# Patient Record
Sex: MALE | Race: BLACK OR AFRICAN AMERICAN | NOT HISPANIC OR LATINO | URBAN - METROPOLITAN AREA
[De-identification: names, ages, dates, MRNs, and addresses within clinical notes are randomized per-mention and may not be internally consistent; named-entity substitution may affect disease eponyms.]

---

## 2023-08-04 ENCOUNTER — INPATIENT (INPATIENT)
Facility: HOSPITAL | Age: 53
LOS: 2 days | Discharge: HOME | DRG: 378 | End: 2023-08-07
Attending: GENERAL ACUTE CARE HOSPITAL | Admitting: GENERAL ACUTE CARE HOSPITAL
Payer: MEDICAID

## 2023-08-04 VITALS
SYSTOLIC BLOOD PRESSURE: 133 MMHG | OXYGEN SATURATION: 98 % | HEART RATE: 143 BPM | TEMPERATURE: 100 F | DIASTOLIC BLOOD PRESSURE: 77 MMHG | RESPIRATION RATE: 18 BRPM | WEIGHT: 207.45 LBS

## 2023-08-04 LAB
ALBUMIN SERPL ELPH-MCNC: 3.5 G/DL — SIGNIFICANT CHANGE UP (ref 3.3–5)
ALP SERPL-CCNC: 37 U/L — LOW (ref 40–120)
ALT FLD-CCNC: 12 U/L — SIGNIFICANT CHANGE UP (ref 10–45)
ANION GAP SERPL CALC-SCNC: 6 MMOL/L — SIGNIFICANT CHANGE UP (ref 5–17)
APTT BLD: 28.3 SEC — SIGNIFICANT CHANGE UP (ref 24.5–35.6)
AST SERPL-CCNC: 15 U/L — SIGNIFICANT CHANGE UP (ref 10–40)
BASOPHILS # BLD AUTO: 0.02 K/UL — SIGNIFICANT CHANGE UP (ref 0–0.2)
BASOPHILS NFR BLD AUTO: 0.3 % — SIGNIFICANT CHANGE UP (ref 0–2)
BILIRUB SERPL-MCNC: <0.2 MG/DL — SIGNIFICANT CHANGE UP (ref 0.2–1.2)
BLD GP AB SCN SERPL QL: NEGATIVE — SIGNIFICANT CHANGE UP
BUN SERPL-MCNC: 27 MG/DL — HIGH (ref 7–23)
CALCIUM SERPL-MCNC: 8.6 MG/DL — SIGNIFICANT CHANGE UP (ref 8.4–10.5)
CHLORIDE SERPL-SCNC: 106 MMOL/L — SIGNIFICANT CHANGE UP (ref 96–108)
CO2 SERPL-SCNC: 26 MMOL/L — SIGNIFICANT CHANGE UP (ref 22–31)
CREAT SERPL-MCNC: 0.92 MG/DL — SIGNIFICANT CHANGE UP (ref 0.5–1.3)
EGFR: 99 ML/MIN/1.73M2 — SIGNIFICANT CHANGE UP
EOSINOPHIL # BLD AUTO: 0.05 K/UL — SIGNIFICANT CHANGE UP (ref 0–0.5)
EOSINOPHIL NFR BLD AUTO: 0.6 % — SIGNIFICANT CHANGE UP (ref 0–6)
GLUCOSE SERPL-MCNC: 144 MG/DL — HIGH (ref 70–99)
HCT VFR BLD CALC: 24.5 % — LOW (ref 39–50)
HCT VFR BLD CALC: 24.8 % — LOW (ref 39–50)
HGB BLD-MCNC: 8.3 G/DL — LOW (ref 13–17)
HGB BLD-MCNC: 8.5 G/DL — LOW (ref 13–17)
IMM GRANULOCYTES NFR BLD AUTO: 0.5 % — SIGNIFICANT CHANGE UP (ref 0–0.9)
INR BLD: 1.09 — SIGNIFICANT CHANGE UP (ref 0.85–1.18)
LACTATE SERPL-SCNC: 1 MMOL/L — SIGNIFICANT CHANGE UP (ref 0.5–2)
LYMPHOCYTES # BLD AUTO: 2.35 K/UL — SIGNIFICANT CHANGE UP (ref 1–3.3)
LYMPHOCYTES # BLD AUTO: 30.3 % — SIGNIFICANT CHANGE UP (ref 13–44)
MCHC RBC-ENTMCNC: 29.5 PG — SIGNIFICANT CHANGE UP (ref 27–34)
MCHC RBC-ENTMCNC: 29.7 PG — SIGNIFICANT CHANGE UP (ref 27–34)
MCHC RBC-ENTMCNC: 33.9 GM/DL — SIGNIFICANT CHANGE UP (ref 32–36)
MCHC RBC-ENTMCNC: 34.3 GM/DL — SIGNIFICANT CHANGE UP (ref 32–36)
MCV RBC AUTO: 86.1 FL — SIGNIFICANT CHANGE UP (ref 80–100)
MCV RBC AUTO: 87.8 FL — SIGNIFICANT CHANGE UP (ref 80–100)
MONOCYTES # BLD AUTO: 0.57 K/UL — SIGNIFICANT CHANGE UP (ref 0–0.9)
MONOCYTES NFR BLD AUTO: 7.3 % — SIGNIFICANT CHANGE UP (ref 2–14)
NEUTROPHILS # BLD AUTO: 4.73 K/UL — SIGNIFICANT CHANGE UP (ref 1.8–7.4)
NEUTROPHILS NFR BLD AUTO: 61 % — SIGNIFICANT CHANGE UP (ref 43–77)
NRBC # BLD: 0 /100 WBCS — SIGNIFICANT CHANGE UP (ref 0–0)
NRBC # BLD: 0 /100 WBCS — SIGNIFICANT CHANGE UP (ref 0–0)
OB PNL STL: POSITIVE
PLATELET # BLD AUTO: 144 K/UL — LOW (ref 150–400)
PLATELET # BLD AUTO: 161 K/UL — SIGNIFICANT CHANGE UP (ref 150–400)
POTASSIUM SERPL-MCNC: 3.6 MMOL/L — SIGNIFICANT CHANGE UP (ref 3.5–5.3)
POTASSIUM SERPL-SCNC: 3.6 MMOL/L — SIGNIFICANT CHANGE UP (ref 3.5–5.3)
PROT SERPL-MCNC: 5.8 G/DL — LOW (ref 6–8.3)
PROTHROM AB SERPL-ACNC: 12.4 SEC — SIGNIFICANT CHANGE UP (ref 9.5–13)
RAPID RVP RESULT: SIGNIFICANT CHANGE UP
RBC # BLD: 2.79 M/UL — LOW (ref 4.2–5.8)
RBC # BLD: 2.88 M/UL — LOW (ref 4.2–5.8)
RBC # FLD: 12.6 % — SIGNIFICANT CHANGE UP (ref 10.3–14.5)
RBC # FLD: 13 % — SIGNIFICANT CHANGE UP (ref 10.3–14.5)
RH IG SCN BLD-IMP: POSITIVE — SIGNIFICANT CHANGE UP
RH IG SCN BLD-IMP: POSITIVE — SIGNIFICANT CHANGE UP
SARS-COV-2 RNA SPEC QL NAA+PROBE: SIGNIFICANT CHANGE UP
SODIUM SERPL-SCNC: 138 MMOL/L — SIGNIFICANT CHANGE UP (ref 135–145)
WBC # BLD: 10.02 K/UL — SIGNIFICANT CHANGE UP (ref 3.8–10.5)
WBC # BLD: 7.76 K/UL — SIGNIFICANT CHANGE UP (ref 3.8–10.5)
WBC # FLD AUTO: 10.02 K/UL — SIGNIFICANT CHANGE UP (ref 3.8–10.5)
WBC # FLD AUTO: 7.76 K/UL — SIGNIFICANT CHANGE UP (ref 3.8–10.5)

## 2023-08-04 PROCEDURE — 74174 CTA ABD&PLVS W/CONTRAST: CPT | Mod: 26

## 2023-08-04 PROCEDURE — 99291 CRITICAL CARE FIRST HOUR: CPT

## 2023-08-04 PROCEDURE — 74018 RADEX ABDOMEN 1 VIEW: CPT | Mod: 26

## 2023-08-04 RX ORDER — SODIUM CHLORIDE 9 MG/ML
1000 INJECTION INTRAMUSCULAR; INTRAVENOUS; SUBCUTANEOUS ONCE
Refills: 0 | Status: COMPLETED | OUTPATIENT
Start: 2023-08-04 | End: 2023-08-04

## 2023-08-04 RX ORDER — PANTOPRAZOLE SODIUM 20 MG/1
80 TABLET, DELAYED RELEASE ORAL ONCE
Refills: 0 | Status: COMPLETED | OUTPATIENT
Start: 2023-08-04 | End: 2023-08-04

## 2023-08-04 RX ORDER — PANTOPRAZOLE SODIUM 20 MG/1
8 TABLET, DELAYED RELEASE ORAL
Qty: 80 | Refills: 0 | Status: DISCONTINUED | OUTPATIENT
Start: 2023-08-04 | End: 2023-08-06

## 2023-08-04 RX ORDER — ACETAMINOPHEN 500 MG
1000 TABLET ORAL ONCE
Refills: 0 | Status: COMPLETED | OUTPATIENT
Start: 2023-08-04 | End: 2023-08-04

## 2023-08-04 RX ADMIN — SODIUM CHLORIDE 1000 MILLILITER(S): 9 INJECTION INTRAMUSCULAR; INTRAVENOUS; SUBCUTANEOUS at 16:25

## 2023-08-04 RX ADMIN — SODIUM CHLORIDE 1000 MILLILITER(S): 9 INJECTION INTRAMUSCULAR; INTRAVENOUS; SUBCUTANEOUS at 20:00

## 2023-08-04 RX ADMIN — Medication 1000 MILLIGRAM(S): at 18:05

## 2023-08-04 RX ADMIN — PANTOPRAZOLE SODIUM 80 MILLIGRAM(S): 20 TABLET, DELAYED RELEASE ORAL at 16:24

## 2023-08-04 RX ADMIN — Medication 400 MILLIGRAM(S): at 16:57

## 2023-08-04 NOTE — CONSULT NOTE ADULT - SUBJECTIVE AND OBJECTIVE BOX
===================== ICU Consult ============================    Consult reason: melena, acute blood loss anemia    Mr Zavala is 53M with no PMH but poor medical followup. He reports he had melena x 3 yesterday no BRBPR. Denies fever, chills, n/v/d/constipation, abdominal pain, chest pain, SOB, hemoptysis.  Pt was seen at bedside resting comfortably satting well on RA receiving 1U pRBC. Denied pain, Last BM this AM reportedly brown. Denies NSAID use and fhx of colon cancer. No hx of endoscopies/colonscopies.    PAST MEDICAL & SURGICAL HISTORY:  No pertinent past medical history      No significant past surgical history          PAST MEDICAL & SURGICAL HISTORY:  No pertinent past medical history    No significant past surgical history    Allergies    Allergy Status Unknown    Intolerances      Home Medications: none      SOCIAL HX:     Smoking        no smoking  ETOH/Illicit drugs        drinks 1-2 Heinekens per night    PAST MEDICAL & SURGICAL HISTORY:  No pertinent past medical history      No significant past surgical history          FAMILY HISTORY:  :      ROS:  See HPI     ICU Vital Signs Last 24 Hrs  T(C): 36.9 (04 Aug 2023 18:40), Max: 37.6 (04 Aug 2023 15:53)  T(F): 98.5 (04 Aug 2023 18:40), Max: 99.7 (04 Aug 2023 15:53)  HR: 130 (04 Aug 2023 20:25) (110 - 143)  BP: 130/75 (04 Aug 2023 20:25) (104/60 - 133/78)  BP(mean): --  ABP: --  ABP(mean): --  RR: 16 (04 Aug 2023 20:25) (16 - 20)  SpO2: 100% (04 Aug 2023 20:25) (98% - 100%)    O2 Parameters below as of 04 Aug 2023 20:25  Patient On (Oxygen Delivery Method): room air            PHYSICAL EXAM  General: NAD  Head: NC/AT; MMM; PERRL; EOMI;  Neck: Supple; no JVD  Respiratory: CTAB; no wheezes/rales/rhonchi  Cardiovascular: Regular rhythm/rate; S1/S2+, no murmurs, rubs gallops   Gastrointestinal: Soft; NTND; bowel sounds normal and present, bounding pulse in abdomen  Extremities: WWP; no edema/cyanosis  Neurological: A&Ox3, CNII-XII grossly intact; no obvious focal deficits  Skin: Clean and intact. Good skin turgor. Without open wounds and sores          LABS:                          8.3    7.76  )-----------( 161      ( 04 Aug 2023 16:15 )             24.5                                               08-04    138  |  106  |  27<H>  ----------------------------<  144<H>  3.6   |  26  |  0.92    Ca    8.6      04 Aug 2023 16:15    TPro  5.8<L>  /  Alb  3.5  /  TBili  <0.2  /  DBili  x   /  AST  15  /  ALT  12  /  AlkPhos  37<L>  08-04      PT/INR - ( 04 Aug 2023 16:15 )   PT: 12.4 sec;   INR: 1.09          PTT - ( 04 Aug 2023 16:15 )  PTT:28.3 sec                                       Urinalysis Basic - ( 04 Aug 2023 16:15 )    Color: x / Appearance: x / SG: x / pH: x  Gluc: 144 mg/dL / Ketone: x  / Bili: x / Urobili: x   Blood: x / Protein: x / Nitrite: x   Leuk Esterase: x / RBC: x / WBC x   Sq Epi: x / Non Sq Epi: x / Bacteria: x                                                  LIVER FUNCTIONS - ( 04 Aug 2023 16:15 )  Alb: 3.5 g/dL / Pro: 5.8 g/dL / ALK PHOS: 37 U/L / ALT: 12 U/L / AST: 15 U/L / GGT: x                                                                                                                                       MEDICATIONS  (STANDING):    MEDICATIONS  (PRN):         ===================== ICU Consult ============================    Consult reason: melena, acute blood loss anemia    53M w/ no known PMHx presents to [facility] ED for episodes of symptomatic melena. Patient had 3 episodes of melena yesterday and one episode earlier today with associated dizziness prompting the ED visit. Patient recently emigrated from [place] 2-3 weeks ago and has had poor medical follow-up previously. Patient endorsed recently having more dyspepsia, however denied GERD, chest or abdominal discomfort. Takes PRN Tylenol as needed. Denies AC, NSAIDs, iron supplements or smoking. Endorsed drinking 1-2 beers (Heineken) every 2-3 days for the last 30 years. No previous known history of cirrhosis and no personal/family history of CRC. Never had EGD/C-scope.      PAST MEDICAL & SURGICAL HISTORY:  No pertinent past medical history      No significant past surgical history      PAST MEDICAL & SURGICAL HISTORY:  No pertinent past medical history    No significant past surgical history    Allergies    Allergy Status Unknown    Intolerances      Home Medications: none      SOCIAL HX:     Smoking        no smoking  ETOH/Illicit drugs        drinks 1-2 Heinek[employer] per night    PAST MEDICAL & SURGICAL HISTORY:  No pertinent past medical history      No significant past surgical history      FAMILY HISTORY:  :      ROS:  See HPI     ICU Vital Signs Last 24 Hrs  T(C): 36.9 (04 Aug 2023 18:40), Max: 37.6 (04 Aug 2023 15:53)  T(F): 98.5 (04 Aug 2023 18:40), Max: 99.7 (04 Aug 2023 15:53)  HR: 130 (04 Aug 2023 20:25) (110 - 143)  BP: 130/75 (04 Aug 2023 20:25) (104/60 - 133/78)  BP(mean): --  ABP: --  ABP(mean): --  RR: 16 (04 Aug 2023 20:25) (16 - 20)  SpO2: 100% (04 Aug 2023 20:25) (98% - 100%)    O2 Parameters below as of 04 Aug 2023 20:25  Patient On (Oxygen Delivery Method): room air      PHYSICAL EXAM  General: NAD, laying in bed, speaking in full sentences  HEENT: head NC/AT, no conjunctival injection, EOMI, dry MM, no dried/alberto blood seen   Neck: supple, no JVD  Cardio: +tachycardic, +S1/S2, no M/R/G  Resp: lungs CTAB, no cough/wheezes/rales/rhonchi, on room air  Abdo: soft, NT, ND, +bowel sounds x4, no organomegaly or palpable mass, +melena    Extremities: WWP, no edema/cyanosis/clubbing   Vasc: 2+ radial and DP pulses b/l  Neuro: A&Ox3, no focal deficits  Psych: speech non-pressured, thoughts goal-oriented  Skin: dry, intact, no visible jaundice   MSK: no joint swelling        LABS:                          8.3    7.76  )-----------( 161      ( 04 Aug 2023 16:15 )             24.5                                               08-04    138  |  106  |  27<H>  ----------------------------<  144<H>  3.6   |  26  |  0.92    Ca    8.6      04 Aug 2023 16:15    TPro  5.8<L>  /  Alb  3.5  /  TBili  <0.2  /  DBili  x   /  AST  15  /  ALT  12  /  AlkPhos  37<L>  08-04      PT/INR - ( 04 Aug 2023 16:15 )   PT: 12.4 sec;   INR: 1.09          PTT - ( 04 Aug 2023 16:15 )  PTT:28.3 sec                                       Urinalysis Basic - ( 04 Aug 2023 16:15 )    Color: x / Appearance: x / SG: x / pH: x  Gluc: 144 mg/dL / Ketone: x  / Bili: x / Urobili: x   Blood: x / Protein: x / Nitrite: x   Leuk Esterase: x / RBC: x / WBC x   Sq Epi: x / Non Sq Epi: x / Bacteria: x                                                  LIVER FUNCTIONS - ( 04 Aug 2023 16:15 )  Alb: 3.5 g/dL / Pro: 5.8 g/dL / ALK PHOS: 37 U/L / ALT: 12 U/L / AST: 15 U/L / GGT: x                                                                                                                                       MEDICATIONS  (STANDING):    MEDICATIONS  (PRN):

## 2023-08-04 NOTE — ED PROVIDER NOTE - CRITICAL CARE ATTENDING CONTRIBUTION TO CARE
Stan Tobin MD:    Patient seen immediately as clinical upgrade for significantly elevated heart rate.     Due to a high probability of clinically significant, life threatening deterioration, the patient required my highest level of preparedness to intervene emergently and I personally spent this critical care time directly and personally managing the patient. This critical care time included obtaining a history; examining the patient; pulse oximetry; ordering and review of studies; arranging urgent treatment with development of a management plan; evaluation of patient's response to treatment; frequent reassessment; and, discussions with other providers.  This critical care time was performed to assess and manage the high probability of imminent, life-threatening deterioration that could result in multi-organ failure. It was exclusive of separately billable procedures and treating other patients and teaching time.

## 2023-08-04 NOTE — CONSULT NOTE ADULT - ASSESSMENT
Mr Sally is 53M with no PMH but poor medical followup presenting for melena x3. ICU consult for melena     s/p protonix IVP 80, NS 2L  - check post transfusion CBC  - maintain active T&S  - maintain large bore IVs Mr Zavala is 53M with no PMH but poor medical followup presenting for melena x3. ICU consult for melena     #UGIB  Patient presenting with UGIB. Hgb 8.3 s/p 1 pRBC and 2L NS. HR 140s.  - Maintain 2 large IV bore  - c/w PPI gtt  - Can consider GI consult in AM  - Keep patient NPO  - Monitor for bleeding  - Trend Hgb  - Maintain active T&S  - Transfuse if Hgb <7       MISC  Fluids: none  Electrolytes: replete K>3.8, Mg>1.8, Phos>2.5 IV  Diet: NPO  DVT Prophylaxis: SCD, HOLD ppx given UGIB   GI Prophylaxis: Pantoprazole gtt  Code Status: Full code  Dispo:     Thank you for this consult. Discussed with Intensivist    Recommendations are final after attending attestation.  Mr Zavala is 53M with no PMH but poor medical followup presenting for melena x3. ICU consult for melena     #Sinus Tachycardia  #Symptomatic GIB  Patient presenting with 4 episodes of melena with dizziness today. On admission, patient was afebrile, HR 140s, BP stable. Hgb 8.3 s/p 1 pRBC and 2L NS. Obtain CTA given c/f pulsatile abdomen, however no evidence of active gastrointestinal hemorrhage. Likely 2/2 esophagitis vs erosive gastritis. No episodes of hemoptysis or known history of cirrhosis. Patient drinks 1-2 beers q2-3 days for 30 years.  - Repeat post-transfusion CBC  - Maintain 2 large IV bore  - c/w PPI gtt for now  - GI consult in AM  - Keep patient NPO  - Monitor for bleeding  - Trend Hgb BID  - Maintain active T&S  - Transfuse if Hgb <7 or if hemodynamics continue to worsen  - Would also bladder scan given distended bladder on imaging as factor of tachycardia      MISC  Fluids: S/p 2L NS  Electrolytes: replete K>3.8, Mg>1.8, Phos>2.5 IV  Diet: NPO  DVT Prophylaxis: SCD, HOLD ppx given UGIB   GI Prophylaxis: Pantoprazole gtt  Code Status: Full code  Dispo: TELE    Thank you for this consult. Discussed with Intensivist Dr. Joaquin  Recommendations are final after attending attestation.  53M w/ no known PMHx presents to [facility] ED for episodes of symptomatic melena. ICU consult for melena     #Sinus Tachycardia  #Symptomatic GIB  Patient presenting with 4 episodes of melena with dizziness today. On admission, patient was afebrile, HR 140s, BP stable. Hgb 8.3 s/p 1 pRBC and 2L NS. Obtain CTA given c/f pulsatile abdomen, however no evidence of active gastrointestinal hemorrhage. Likely 2/2 esophagitis vs erosive gastritis. No episodes of hemoptysis or known history of cirrhosis. Patient drinks 1-2 beers q2-3 days for 30 years.  - Repeat post-transfusion CBC  - Maintain 2 large IV bore  - c/w PPI gtt for now  - GI consult in AM  - Keep patient NPO  - Monitor for bleeding  - Trend Hgb BID  - Maintain active T&S  - Transfuse if Hgb <7 or if hemodynamics continue to worsen or active bleeding  - Would also bladder scan given distended bladder on imaging as factor of tachycardia      MISC  Fluids: S/p 2L NS  Electrolytes: replete K>3.8, Mg>1.8, Phos>2.5 IV  Diet: NPO  DVT Prophylaxis: SCD, HOLD ppx given UGIB   GI Prophylaxis: Pantoprazole gtt  Code Status: Full code  Dispo: TELE    Thank you for this consult. Discussed with Intensivist Dr. Joaquin  Recommendations are final after attending attestation.

## 2023-08-04 NOTE — ED ADULT NURSE REASSESSMENT NOTE - NS ED NURSE REASSESS COMMENT FT1
pt aaox3 no deficits.   no sob no chest pain no abd pain no n/v.  PRBC infusing via pump.  iv fluids infusing.   pending dispo and admission.
pt remains comf.   no sob, chest pain or n/v.  PRBC completed.   pending admission.
Patient to receive blood transfusion. Labs reviewed. Patient consent in chart. Patient educated on possible signs of blood transfusion and informed to notify staff if patient were to develop any severe respiratory distress, flank pain, or any other major concerns. Patient with two large bore IV access sites. Patient to receive VS per policy and procedure. Blood product checked with second RN. Will continue to monitor with frequent VS and for possible transfusion reactions.

## 2023-08-04 NOTE — ED PROVIDER NOTE - PROGRESS NOTE DETAILS
Stan Tobin MD: Hgb 8.3, reassesesed bedside, still mentating well, comfortably, has some +chills. Hemodynamically stable, HR improved to 110s. Consented for pRBC transfusion, discussed r/b/a, written consent obtained and placed into chart. Will treat for symptomatic anemia in setting of likely upper GI bleed, guiac positive, tba. Stan Tobin MD: Patient reassessed again, -110s, BP stable, much more comfortable after IV ofirmev dose. pRBC ordered pending receipt from blood bank. Signed out to CARO. Stan Tobin MD: Patient found to be HR in 120-130s by medicine resident, concern from CARO regarding safety for floor dispo, will call ICU for eval. Stan Tobin MD: Patient seen by ICU resident, requests CTA abdomen/pelvis and additional 1L IVF bolus, ordered.

## 2023-08-04 NOTE — ED PROVIDER NOTE - PHYSICAL EXAMINATION
Gen - NAD, tired appearing but mentating fully; A+Ox3   HEENT - NCAT, EOMI, moist mucous membranes, clear oropharynx, pale conjunctiva  Neck - supple  Resp - CTAB, no increased WOB  CV -  RRR, no m/r/g  Abd - soft, NT, ND; no guarding or rebound  Back - no CVA tenderness  MSK - FROM of b/l UE and LE, no gross deformities  Extrem - no LE edema  Neuro - no focal motor or sensation deficits  Skin - warm, well perfused  Rectal (chaperone MS4 Renetta) - no ext/int hemorrhoids, +melenic stool, no alberto blood/active hemorrhage No

## 2023-08-04 NOTE — ED PROVIDER NOTE - CLINICAL SUMMARY MEDICAL DECISION MAKING FREE TEXT BOX
53 year old male, no reported pmh/psx, hasn't seen a doctor in years, presenting with generalized weakness in setting of melena x 2d. Tachycardic to 140s here on arrival but BP wnl/stable, mentating well on exam, good distal perfusion. Rectal exam showing melenic stool but no active hemorrhage/alberto blood. Abdomen soft/NT. Suspect symptomatic anemia 2/2 upper GI bleed. IV PPI push dose given. Will likely need pRBC transfusion pending cbc result. Anticipate admission.

## 2023-08-04 NOTE — ED ADULT TRIAGE NOTE - CHIEF COMPLAINT QUOTE
"I feel weak and I have black stools since yesterday."  pt appears having difficulty walking and speaking full sentences.

## 2023-08-04 NOTE — ED PROVIDER NOTE - OBJECTIVE STATEMENT
53 year old male, no reported pmh/psx, hasn't seen a doctor in years, presenting with generalized weakness in setting of melena x 2d. Per patient and spouse collateral at bedside--patient had ~3 episodes of black/tarry stools yesterday, 1 today, no abdominal/rectal pain, reports generalized weakness/lightheadedness and possible low grade fevers. Does not take blood thinners. No recent travel or sick contacts. Denies chest pain, cough, sob, n/v/d, dysuria.

## 2023-08-04 NOTE — ED ADULT NURSE NOTE - BEFAST SCREENING
Negative

## 2023-08-04 NOTE — ED ADULT NURSE NOTE - NSFALLUNIVINTERV_ED_ALL_ED
Bed/Stretcher in lowest position, wheels locked, appropriate side rails in place/Call bell, personal items and telephone in reach/Instruct patient to call for assistance before getting out of bed/chair/stretcher/Non-slip footwear applied when patient is off stretcher/[place] to call system/Physically safe environment - no spills, clutter or unnecessary equipment/Purposeful proactive rounding/Room/bathroom lighting operational, light cord in reach

## 2023-08-04 NOTE — ED ADULT NURSE NOTE - OBJECTIVE STATEMENT
Pt is 53 y.o male pt walked in c/o black tarry stools 3 times yesterday and one episode today. Denies cp, sob, fever, chills, n/v/d, abd pain. No known PMHx. Pt upgraded to Dr. Tobin at bs. EKG in prog. Pt hooked to monitor and spo2.nerted and labs sent. Pt A&Ox4. Pt is conversive in full sentences. Assessment ongoing. Will cont to monitor.

## 2023-08-05 DIAGNOSIS — I48.91 UNSPECIFIED ATRIAL FIBRILLATION: ICD-10-CM

## 2023-08-05 DIAGNOSIS — K92.2 GASTROINTESTINAL HEMORRHAGE, UNSPECIFIED: ICD-10-CM

## 2023-08-05 DIAGNOSIS — Z29.9 ENCOUNTER FOR PROPHYLACTIC MEASURES, UNSPECIFIED: ICD-10-CM

## 2023-08-05 DIAGNOSIS — D64.9 ANEMIA, UNSPECIFIED: ICD-10-CM

## 2023-08-05 DIAGNOSIS — R00.0 TACHYCARDIA, UNSPECIFIED: ICD-10-CM

## 2023-08-05 LAB
ALBUMIN SERPL ELPH-MCNC: 3.5 G/DL — SIGNIFICANT CHANGE UP (ref 3.3–5)
ALP SERPL-CCNC: 33 U/L — LOW (ref 40–120)
ALT FLD-CCNC: 10 U/L — SIGNIFICANT CHANGE UP (ref 10–45)
ANION GAP SERPL CALC-SCNC: 8 MMOL/L — SIGNIFICANT CHANGE UP (ref 5–17)
APPEARANCE UR: CLEAR — SIGNIFICANT CHANGE UP
AST SERPL-CCNC: 16 U/L — SIGNIFICANT CHANGE UP (ref 10–40)
BASOPHILS # BLD AUTO: 0.04 K/UL — SIGNIFICANT CHANGE UP (ref 0–0.2)
BASOPHILS # BLD AUTO: 0.04 K/UL — SIGNIFICANT CHANGE UP (ref 0–0.2)
BASOPHILS NFR BLD AUTO: 0.4 % — SIGNIFICANT CHANGE UP (ref 0–2)
BASOPHILS NFR BLD AUTO: 0.5 % — SIGNIFICANT CHANGE UP (ref 0–2)
BILIRUB SERPL-MCNC: 0.3 MG/DL — SIGNIFICANT CHANGE UP (ref 0.2–1.2)
BILIRUB UR-MCNC: NEGATIVE — SIGNIFICANT CHANGE UP
BLD GP AB SCN SERPL QL: NEGATIVE — SIGNIFICANT CHANGE UP
BUN SERPL-MCNC: 12 MG/DL — SIGNIFICANT CHANGE UP (ref 7–23)
CALCIUM SERPL-MCNC: 7.7 MG/DL — LOW (ref 8.4–10.5)
CHLORIDE SERPL-SCNC: 107 MMOL/L — SIGNIFICANT CHANGE UP (ref 96–108)
CO2 SERPL-SCNC: 25 MMOL/L — SIGNIFICANT CHANGE UP (ref 22–31)
COLOR SPEC: YELLOW — SIGNIFICANT CHANGE UP
CREAT SERPL-MCNC: 0.88 MG/DL — SIGNIFICANT CHANGE UP (ref 0.5–1.3)
DIFF PNL FLD: NEGATIVE — SIGNIFICANT CHANGE UP
EGFR: 102 ML/MIN/1.73M2 — SIGNIFICANT CHANGE UP
EOSINOPHIL # BLD AUTO: 0.06 K/UL — SIGNIFICANT CHANGE UP (ref 0–0.5)
EOSINOPHIL # BLD AUTO: 0.24 K/UL — SIGNIFICANT CHANGE UP (ref 0–0.5)
EOSINOPHIL NFR BLD AUTO: 0.6 % — SIGNIFICANT CHANGE UP (ref 0–6)
EOSINOPHIL NFR BLD AUTO: 3.1 % — SIGNIFICANT CHANGE UP (ref 0–6)
FERRITIN SERPL-MCNC: 138 NG/ML — SIGNIFICANT CHANGE UP (ref 30–400)
GLUCOSE SERPL-MCNC: 106 MG/DL — HIGH (ref 70–99)
GLUCOSE UR QL: NEGATIVE — SIGNIFICANT CHANGE UP
HAV IGM SER-ACNC: SIGNIFICANT CHANGE UP
HBV CORE IGM SER-ACNC: SIGNIFICANT CHANGE UP
HBV SURFACE AG SER-ACNC: SIGNIFICANT CHANGE UP
HCT VFR BLD CALC: 25.1 % — LOW (ref 39–50)
HCT VFR BLD CALC: 25.4 % — LOW (ref 39–50)
HCT VFR BLD CALC: 25.5 % — LOW (ref 39–50)
HCV AB S/CO SERPL IA: 0.04 S/CO — SIGNIFICANT CHANGE UP
HCV AB SERPL-IMP: SIGNIFICANT CHANGE UP
HGB BLD-MCNC: 8.3 G/DL — LOW (ref 13–17)
HGB BLD-MCNC: 8.6 G/DL — LOW (ref 13–17)
HGB BLD-MCNC: 8.6 G/DL — LOW (ref 13–17)
IMM GRANULOCYTES NFR BLD AUTO: 0.3 % — SIGNIFICANT CHANGE UP (ref 0–0.9)
IMM GRANULOCYTES NFR BLD AUTO: 0.3 % — SIGNIFICANT CHANGE UP (ref 0–0.9)
IRON SATN MFR SERPL: 22 % — SIGNIFICANT CHANGE UP (ref 16–55)
IRON SATN MFR SERPL: 50 UG/DL — SIGNIFICANT CHANGE UP (ref 45–165)
KETONES UR-MCNC: NEGATIVE — SIGNIFICANT CHANGE UP
LEUKOCYTE ESTERASE UR-ACNC: NEGATIVE — SIGNIFICANT CHANGE UP
LYMPHOCYTES # BLD AUTO: 2.5 K/UL — SIGNIFICANT CHANGE UP (ref 1–3.3)
LYMPHOCYTES # BLD AUTO: 2.7 K/UL — SIGNIFICANT CHANGE UP (ref 1–3.3)
LYMPHOCYTES # BLD AUTO: 25.3 % — SIGNIFICANT CHANGE UP (ref 13–44)
LYMPHOCYTES # BLD AUTO: 35.2 % — SIGNIFICANT CHANGE UP (ref 13–44)
MAGNESIUM SERPL-MCNC: 2 MG/DL — SIGNIFICANT CHANGE UP (ref 1.6–2.6)
MCHC RBC-ENTMCNC: 29.1 PG — SIGNIFICANT CHANGE UP (ref 27–34)
MCHC RBC-ENTMCNC: 29.3 PG — SIGNIFICANT CHANGE UP (ref 27–34)
MCHC RBC-ENTMCNC: 29.6 PG — SIGNIFICANT CHANGE UP (ref 27–34)
MCHC RBC-ENTMCNC: 33.1 GM/DL — SIGNIFICANT CHANGE UP (ref 32–36)
MCHC RBC-ENTMCNC: 33.7 GM/DL — SIGNIFICANT CHANGE UP (ref 32–36)
MCHC RBC-ENTMCNC: 33.9 GM/DL — SIGNIFICANT CHANGE UP (ref 32–36)
MCV RBC AUTO: 86.7 FL — SIGNIFICANT CHANGE UP (ref 80–100)
MCV RBC AUTO: 87.3 FL — SIGNIFICANT CHANGE UP (ref 80–100)
MCV RBC AUTO: 88.1 FL — SIGNIFICANT CHANGE UP (ref 80–100)
MONOCYTES # BLD AUTO: 0.5 K/UL — SIGNIFICANT CHANGE UP (ref 0–0.9)
MONOCYTES # BLD AUTO: 0.63 K/UL — SIGNIFICANT CHANGE UP (ref 0–0.9)
MONOCYTES NFR BLD AUTO: 6.4 % — SIGNIFICANT CHANGE UP (ref 2–14)
MONOCYTES NFR BLD AUTO: 6.5 % — SIGNIFICANT CHANGE UP (ref 2–14)
NEUTROPHILS # BLD AUTO: 4.17 K/UL — SIGNIFICANT CHANGE UP (ref 1.8–7.4)
NEUTROPHILS # BLD AUTO: 6.64 K/UL — SIGNIFICANT CHANGE UP (ref 1.8–7.4)
NEUTROPHILS NFR BLD AUTO: 54.4 % — SIGNIFICANT CHANGE UP (ref 43–77)
NEUTROPHILS NFR BLD AUTO: 67 % — SIGNIFICANT CHANGE UP (ref 43–77)
NITRITE UR-MCNC: NEGATIVE — SIGNIFICANT CHANGE UP
NRBC # BLD: 0 /100 WBCS — SIGNIFICANT CHANGE UP (ref 0–0)
PH UR: 7 — SIGNIFICANT CHANGE UP (ref 5–8)
PHOSPHATE SERPL-MCNC: 3 MG/DL — SIGNIFICANT CHANGE UP (ref 2.5–4.5)
PLATELET # BLD AUTO: 133 K/UL — LOW (ref 150–400)
PLATELET # BLD AUTO: 135 K/UL — LOW (ref 150–400)
PLATELET # BLD AUTO: 136 K/UL — LOW (ref 150–400)
POTASSIUM SERPL-MCNC: 3.7 MMOL/L — SIGNIFICANT CHANGE UP (ref 3.5–5.3)
POTASSIUM SERPL-SCNC: 3.7 MMOL/L — SIGNIFICANT CHANGE UP (ref 3.5–5.3)
PROT SERPL-MCNC: 5.5 G/DL — LOW (ref 6–8.3)
PROT UR-MCNC: NEGATIVE MG/DL — SIGNIFICANT CHANGE UP
RBC # BLD: 2.85 M/UL — LOW (ref 4.2–5.8)
RBC # BLD: 2.91 M/UL — LOW (ref 4.2–5.8)
RBC # BLD: 2.94 M/UL — LOW (ref 4.2–5.8)
RBC # FLD: 13.3 % — SIGNIFICANT CHANGE UP (ref 10.3–14.5)
RBC # FLD: 13.4 % — SIGNIFICANT CHANGE UP (ref 10.3–14.5)
RBC # FLD: 13.6 % — SIGNIFICANT CHANGE UP (ref 10.3–14.5)
RH IG SCN BLD-IMP: POSITIVE — SIGNIFICANT CHANGE UP
SODIUM SERPL-SCNC: 140 MMOL/L — SIGNIFICANT CHANGE UP (ref 135–145)
SP GR SPEC: 1.01 — SIGNIFICANT CHANGE UP (ref 1–1.03)
T4 FREE SERPL-MCNC: 1.01 NG/DL — SIGNIFICANT CHANGE UP (ref 0.93–1.7)
TIBC SERPL-MCNC: 231 UG/DL — SIGNIFICANT CHANGE UP (ref 220–430)
TRANSFERRIN SERPL-MCNC: 187 MG/DL — LOW (ref 200–360)
TSH SERPL-MCNC: 10.93 UIU/ML — HIGH (ref 0.27–4.2)
UIBC SERPL-MCNC: 181 UG/DL — SIGNIFICANT CHANGE UP (ref 110–370)
UROBILINOGEN FLD QL: 0.2 E.U./DL — SIGNIFICANT CHANGE UP
WBC # BLD: 7.67 K/UL — SIGNIFICANT CHANGE UP (ref 3.8–10.5)
WBC # BLD: 7.78 K/UL — SIGNIFICANT CHANGE UP (ref 3.8–10.5)
WBC # BLD: 9.39 K/UL — SIGNIFICANT CHANGE UP (ref 3.8–10.5)
WBC # FLD AUTO: 7.67 K/UL — SIGNIFICANT CHANGE UP (ref 3.8–10.5)
WBC # FLD AUTO: 7.78 K/UL — SIGNIFICANT CHANGE UP (ref 3.8–10.5)
WBC # FLD AUTO: 9.39 K/UL — SIGNIFICANT CHANGE UP (ref 3.8–10.5)

## 2023-08-05 PROCEDURE — 99233 SBSQ HOSP IP/OBS HIGH 50: CPT | Mod: GC

## 2023-08-05 PROCEDURE — 99254 IP/OBS CNSLTJ NEW/EST MOD 60: CPT

## 2023-08-05 PROCEDURE — 93970 EXTREMITY STUDY: CPT | Mod: 26

## 2023-08-05 RX ORDER — SODIUM CHLORIDE 9 MG/ML
1000 INJECTION, SOLUTION INTRAVENOUS ONCE
Refills: 0 | Status: COMPLETED | OUTPATIENT
Start: 2023-08-05 | End: 2023-08-05

## 2023-08-05 RX ORDER — METOPROLOL TARTRATE 50 MG
25 TABLET ORAL EVERY 12 HOURS
Refills: 0 | Status: DISCONTINUED | OUTPATIENT
Start: 2023-08-05 | End: 2023-08-06

## 2023-08-05 RX ORDER — METOPROLOL TARTRATE 50 MG
5 TABLET ORAL ONCE
Refills: 0 | Status: COMPLETED | OUTPATIENT
Start: 2023-08-05 | End: 2023-08-05

## 2023-08-05 RX ADMIN — PANTOPRAZOLE SODIUM 10 MG/HR: 20 TABLET, DELAYED RELEASE ORAL at 22:17

## 2023-08-05 RX ADMIN — PANTOPRAZOLE SODIUM 10 MG/HR: 20 TABLET, DELAYED RELEASE ORAL at 13:04

## 2023-08-05 RX ADMIN — PANTOPRAZOLE SODIUM 10 MG/HR: 20 TABLET, DELAYED RELEASE ORAL at 01:37

## 2023-08-05 RX ADMIN — Medication 25 MILLIGRAM(S): at 16:38

## 2023-08-05 RX ADMIN — Medication 5 MILLIGRAM(S): at 13:04

## 2023-08-05 RX ADMIN — SODIUM CHLORIDE 1000 MILLILITER(S): 9 INJECTION, SOLUTION INTRAVENOUS at 01:11

## 2023-08-05 NOTE — H&P ADULT - HISTORY OF PRESENT ILLNESS
53 year old M w/ no known PMHx presents to [facility] ED for episodes of symptomatic melena. Patient had 3 episodes of melena yesterday and one episode earlier today with associated lightheadedness, prompting the ED visit. Patient recently emigrated from [place] 2-3 weeks ago and has had poor medical follow-up previously. Patient endorsed recently having more dyspepsia, however denied GERD, chest or abdominal discomfort. Takes PRN Tylenol as needed. Denies AC, NSAIDs, iron supplements or smoking. Endorsed drinking 1-2 beers (Heineken) every 2-3 days for the last 30 years. No previous known history of cirrhosis and no personal/family history of CRC. Never had EGD/C-scope. Denies any fever, chills, abdominal pain, n/v/d, chest pain.     ED course:   Initial vital signs: T: 99.7F, HR: 143, BP: 133/77, R: 18, SpO2: 98% on RA  Labs: significant for Hb 8.3, Hct 24.5, MCV 87.8, BUN 27,   XR abdomen: Nonobstructive bowel gas pattern. No free air.  CT angio abdomen: No evidence of active gastrointestinal hemorrhage.  EKG: Sinus tachycardia at 143 bpm, DE interval 118 ms, QRS duration 74 ms, QT/QTc 334/515 ms   Medications: Tylenol 1g IV x1, Protonix 80mg IV x1, 2L NS IV bolus  Consults: ICU   53 year old M w/ no known PMHx presents to [facility] ED for episodes of symptomatic melena. Patient had 3 episodes of melena yesterday and one episode earlier today with associated lightheadedness, prompting the ED visit. Patient recently emigrated from [place] 2-3 weeks ago and has had poor medical follow-up previously. Patient endorsed recently having more dyspepsia, however denied GERD, chest or abdominal discomfort. Takes PRN Tylenol as needed. Denies AC, NSAIDs, iron supplements or smoking. Endorsed drinking 1-2 beers (Heineken) every 2-3 days for the last 30 years. No previous known history of cirrhosis and no personal/family history of CRC. Never had EGD/C-scope. Denies any fever, chills, abdominal pain, n/v/d, chest pain.     ED course:   Initial vital signs: T: 99.7F, HR: 143, BP: 133/77, R: 18, SpO2: 98% on RA  Labs: significant for Hb 8.3, Hct 24.5, MCV 87.8, BUN 27.   XR abdomen: Nonobstructive bowel gas pattern. No free air.  CT angio abdomen: No evidence of active gastrointestinal hemorrhage.  EKG: Sinus tachycardia at 143 bpm, VA interval 118 ms, QRS duration 74 ms, QT/QTc 334/515 ms   Medications: Tylenol 1g IV x1, Protonix 80mg IV x1, 2L NS IV bolus  Consults: ICU

## 2023-08-05 NOTE — PROGRESS NOTE ADULT - SUBJECTIVE AND OBJECTIVE BOX
SUBJECTIVE/OVERNIGHT EVENTS: No acute overnight events. Pt seen in AM at bedside, resting comfortably in bed, and does not appear to be in any acute distress. When asked, pt denies any recent or active fever, chills, nausea, vomiting, headache, acute sob, chest pain, abdominal pain, genitourinary sx, extremity pain or swelling.    This morning was tachy into 140s in Afib with RVR vs SVT. Lopressor 5mg given to good effect.    VITAL SIGNS:  Vital Signs Last 24 Hrs  T(C): 36.9 (05 Aug 2023 16:57), Max: 37.3 (04 Aug 2023 22:15)  T(F): 98.5 (05 Aug 2023 16:57), Max: 99.2 (04 Aug 2023 22:15)  HR: 114 (05 Aug 2023 16:36) (98 - 130)  BP: 134/59 (05 Aug 2023 16:36) (104/60 - 134/59)  BP(mean): 85 (05 Aug 2023 16:36) (74 - 89)  RR: 18 (05 Aug 2023 16:36) (16 - 20)  SpO2: 100% (05 Aug 2023 16:36) (98% - 100%)    Parameters below as of 05 Aug 2023 16:36  Patient On (Oxygen Delivery Method): room air        PHYSICAL EXAM:  General: NAD; speaking in full sentences  HEENT: NC/AT; PERRL; EOMI; MMM  Neck: supple; no JVD  Cardiac: Tachycardic RR; +S1/S2  Pulm: CTA B/L; no W/R/R  GI: soft, NT/ND, +BS  Extremities: WWP; no edema, clubbing or cyanosis  Vasc: 2+ radial, DP pulses B/L  Neuro: AAOx3; no focal deficits    MEDICATIONS:  MEDICATIONS  (STANDING):  metoprolol tartrate 25 milliGRAM(s) Oral every 12 hours  pantoprazole Infusion 8 mG/Hr (10 mL/Hr) IV Continuous <Continuous>    MEDICATIONS  (PRN):      ALLERGIES:  Allergies    Allergy Status Unknown    Intolerances        LABS:                        8.6    7.78  )-----------( 133      ( 05 Aug 2023 12:00 )             25.4     08-05    140  |  107  |  12  ----------------------------<  106<H>  3.7   |  25  |  0.88    Ca    7.7<L>      05 Aug 2023 07:32  Phos  3.0     08-05  Mg     2.0     08-05    TPro  5.5<L>  /  Alb  3.5  /  TBili  0.3  /  DBili  x   /  AST  16  /  ALT  10  /  AlkPhos  33<L>  08-05    PT/INR - ( 04 Aug 2023 16:15 )   PT: 12.4 sec;   INR: 1.09          PTT - ( 04 Aug 2023 16:15 )  PTT:28.3 sec    RADIOLOGY & ADDITIONAL TESTS: Reviewed.

## 2023-08-05 NOTE — CONSULT NOTE ADULT - SUBJECTIVE AND OBJECTIVE BOX
GASTROENTEROLOGY CONSULT NOTE  HPI:  52 yo M , no apparent PMHx , pw melena.   3 episodes of melena day PTA, with associated lightheadedness, prompting the ED visit.   Patient recently emigrated from [place] 2-3 weeks ago and has had poor medical follow-up previously.   Patient denied GERD, chest or abdominal discomfort.   Takes PRN Tylenol as needed.   Denies AC, NSAIDs, iron supplements or smoking.   Endorsed drinking 1-2 beers (Heineken) every 2-3 days for the last 30 years.   No previous known history of cirrhosis and no personal/family history of CRC.   Never had EGD/Colonoscopy  Had one additional episode this afternoon with stable repeat CBC    Noted he had chills and low grade temps  HR have persistent in the low 110's  Plts in 130's range    Allergies    Allergy Status Unknown    Intolerances      Home Medications:    MEDICATIONS:  MEDICATIONS  (STANDING):  metoprolol tartrate 25 milliGRAM(s) Oral every 12 hours  pantoprazole Infusion 8 mG/Hr (10 mL/Hr) IV Continuous <Continuous>    MEDICATIONS  (PRN):    PAST MEDICAL & SURGICAL HISTORY:  No pertinent past medical history      denies prior surgeries         FAMILY HISTORY: htn    SOCIAL HISTORY:  social etoh use    REVIEW OF SYSTEMS:  All other 10 review of systems is negative unless indicated above.    Vital Signs Last 24 Hrs  T(C): 37.3 (05 Aug 2023 14:19), Max: 37.6 (04 Aug 2023 15:53)  T(F): 99.2 (05 Aug 2023 14:19), Max: 99.7 (04 Aug 2023 15:53)  HR: 112 (05 Aug 2023 08:24) (98 - 143)  BP: 114/58 (05 Aug 2023 08:24) (104/60 - 133/78)  BP(mean): 78 (05 Aug 2023 08:24) (74 - 89)  RR: 17 (05 Aug 2023 08:24) (16 - 20)  SpO2: 100% (05 Aug 2023 08:24) (98% - 100%)    Parameters below as of 05 Aug 2023 08:24  Patient On (Oxygen Delivery Method): room air        08-04 @ 07:01  -  08-05 @ 07:00  --------------------------------------------------------  IN: 1070 mL / OUT: 650 mL / NET: 420 mL    08-05 @ 07:01  -  08-05 @ 15:40  --------------------------------------------------------  IN: 10 mL / OUT: 700 mL / NET: -690 mL        PHYSICAL EXAM:    General: lying in bed, in no acute distress  HEENT: Neck supple, mmm, no jvd  Lungs: Normal respiratory effort, no intercostal retractions  Cardiovascular: tachycardic  Abdomen: Soft, non-tender non-distended; No rebound or guarding  Extremities: wwp, no cce  Neurological: [place]LE[place], speech fluent  Skin: Warm and dry. No obvious rash    LABS:                        8.6    7.78  )-----------( 133      ( 05 Aug 2023 12:00 )             25.4     08-05    140  |  107  |  12  ----------------------------<  106<H>  3.7   |  25  |  0.88    Ca    7.7<L>      05 Aug 2023 07:32  Phos  3.0     08-05  Mg     2.0     08-05    TPro  5.5<L>  /  Alb  3.5  /  TBili  0.3  /  DBili  x   /  AST  16  /  ALT  10  /  AlkPhos  33<L>  08-05        PT/INR - ( 04 Aug 2023 16:15 )   PT: 12.4 sec;   INR: 1.09          PTT - ( 04 Aug 2023 16:15 )  PTT:28.3 sec    Urinalysis with Rflx Culture (collected 05 Aug 2023 13:26)      RADIOLOGY & ADDITIONAL STUDIES:     Reviewed

## 2023-08-05 NOTE — CONSULT NOTE ADULT - SUBJECTIVE AND OBJECTIVE BOX
**Incomplete Note**    Cardiology Consult      HPI:  53 year old M w/ no known PMHx presents to [facility] ED for episodes of symptomatic melena. Patient had 3 episodes of melena yesterday and one episode earlier today with associated lightheadedness, prompting the ED visit. Patient recently emigrated from [place] 2-3 weeks ago and has had poor medical follow-up previously. Patient endorsed recently having more dyspepsia, however denied GERD, chest or abdominal discomfort. Takes PRN Tylenol as needed. Denies AC, NSAIDs, iron supplements or smoking. Endorsed drinking 1-2 beers (Heineken) every 2-3 days for the last 30 years. No previous known history of cirrhosis and no personal/family history of CRC. Never had EGD/C-scope. Denies any fever, chills, abdominal pain, n/v/d, chest pain.     ED course:   Initial vital signs: T: 99.7F, HR: 143, BP: 133/77, R: 18, SpO2: 98% on RA  Labs: significant for Hb 8.3, Hct 24.5, MCV 87.8, BUN 27.   XR abdomen: Nonobstructive bowel gas pattern. No free air.  CT angio abdomen: No evidence of active gastrointestinal hemorrhage.  EKG: Sinus tachycardia at 143 bpm, ND interval 118 ms, QRS duration 74 ms, QT/QTc 334/515 ms   Medications: Tylenol 1g IV x1, Protonix 80mg IV x1, 2L NS IV bolus  Consults: ICU   (05 Aug 2023 01:24)        Pt seen and examined at bedside, NAD, denies chest pain/pressure/discomfort. ROS s/f ?,      Review of Systems:  CONSTITUTIONAL:  No weight loss, fever, chills, weakness or fatigue.  HEENT:  Eyes:  No visual loss, blurred vision, double vision or yellow sclerae. Ears, Nose, Throat:  No hearing loss, sneezing, congestion, runny nose or sore throat.  SKIN:  No rash or itching.  CARDIOVASCULAR:  No chest pain, chest pressure or chest discomfort. No palpitations or edema.  RESPIRATORY:  No shortness of breath, cough or sputum.  GASTROINTESTINAL:  No anorexia, nausea, vomiting or diarrhea. No abdominal pain or blood.  GENITOURINARY: No Burning on urination.   NEUROLOGICAL:  see HPI  MUSCULOSKELETAL:  No muscle, back pain, joint pain or stiffness.  HEMATOLOGIC:  No anemia, bleeding or bruising.  LYMPHATICS:  No enlarged nodes. No history of splenectomy.  PSYCHIATRIC:  No history of depression or anxiety.  ENDOCRINOLOGIC:  No reports of sweating, cold or heat intolerance. No polyuria or polydipsia.  ALLERGIES:  No history of asthma, hives, eczema or rhinitis.    PAST MEDICAL & SURGICAL HISTORY:  No pertinent past medical history      No significant past surgical history        SOCIAL HISTORY:  FAMILY HISTORY:    ALLERGIES: 	  Allergy Status Unknown          MEDICATIONS:  metoprolol tartrate 25 milliGRAM(s) Oral every 12 hours  pantoprazole Infusion 8 mG/Hr IV Continuous <Continuous>      T(C): 36.6 (08-05-23 @ 09:57), Max: 37.6 (08-04-23 @ 15:53)  HR: 112 (08-05-23 @ 08:24) (98 - 143)  BP: 114/58 (08-05-23 @ 08:24) (104/60 - 133/78)  RR: 17 (08-05-23 @ 08:24) (16 - 20)  SpO2: 100% (08-05-23 @ 08:24) (98% - 100%)    08-04-23 @ 07:01  -  08-05-23 @ 07:00  --------------------------------------------------------  IN: 1070 mL / OUT: 650 mL / NET: 420 mL    08-05-23 @ 07:01  -  08-05-23 @ 13:55  --------------------------------------------------------  IN: 10 mL / OUT: 700 mL / NET: -690 mL        PHYSICAL EXAM:    Constitutional: resting comfortably in bed; NAD  HEENT: NC/AT, PERRL, EOMI, anicteric sclera, no nasal discharge; uvula midline, no oropharyngeal erythema or exudates; MMM  Neck: supple; no thyromegaly, JVP ? cm H20, JVD +/-  Respiratory: CTA B/L; no W/R/R, no retractions  Cardiac: +S1/S2; RRR; no M/R/G; PMI non-displaced  Gastrointestinal: soft, NT/ND; no rebound or guarding; +BSx4  Extremities: WWP, no clubbing or cyanosis; no peripheral edema  Musculoskeletal: NROM x4; no joint swelling, tenderness or erythema  Vascular: 2+ radial, DP/PT pulses B/L  Dermatologic: skin warm, dry and intact; no rashes, wounds, or scars  Lymphatic: no submandibular or cervical LAD  Neurologic: AAOx3; CNII-XII grossly intact; no focal deficits        I&O's Summary    04 Aug 2023 07:01  -  05 Aug 2023 07:00  --------------------------------------------------------  IN: 1070 mL / OUT: 650 mL / NET: 420 mL    05 Aug 2023 07:01  -  05 Aug 2023 13:55  --------------------------------------------------------  IN: 10 mL / OUT: 700 mL / NET: -690 mL        Weight (kg): 94.1 (08-04 @ 15:53)  LABS:	 	                        8.6    7.78  )-----------( 133      ( 05 Aug 2023 12:00 )             25.4     08-05    140  |  107  |  12  ----------------------------<  106<H>  3.7   |  25  |  0.88    Ca    7.7<L>      05 Aug 2023 07:32  Phos  3.0     08-05  Mg     2.0     08-05    TPro  5.5<L>  /  Alb  3.5  /  TBili  0.3  /  DBili  x   /  AST  16  /  ALT  10  /  AlkPhos  33<L>  08-05       Cardiology Consult      HPI:  53 year old M w/ no known PMHx presents to [facility] ED for episodes of symptomatic melena. Patient had 3 episodes of melena yesterday and one episode earlier today with associated lightheadedness, prompting the ED visit. Patient recently emigrated from [place] 2-3 weeks ago and has had poor medical follow-up previously. Patient endorsed recently having more dyspepsia, however denied GERD, chest or abdominal discomfort. Takes PRN Tylenol as needed. Denies AC, NSAIDs, iron supplements or smoking. Endorsed drinking 1-2 beers (Heineken) every 2-3 days for the last 30 years. No previous known history of cirrhosis and no personal/family history of CRC. Never had EGD/C-scope. Denies any fever, chills, abdominal pain, n/v/d, chest pain.     ED course:   Initial vital signs: T: 99.7F, HR: 143, BP: 133/77, R: 18, SpO2: 98% on RA  Labs: significant for Hb 8.3, Hct 24.5, MCV 87.8, BUN 27.   XR abdomen: Nonobstructive bowel gas pattern. No free air.  CT angio abdomen: No evidence of active gastrointestinal hemorrhage.  EKG: Sinus tachycardia at 143 bpm, MA interval 118 ms, QRS duration 74 ms, QT/QTc 334/515 ms   Medications: Tylenol 1g IV x1, Protonix 80mg IV x1, 2L NS IV bolus  Consults: ICU   (05 Aug 2023 01:24)        Pt seen and examined at bedside, NAD, denies chest pain/pressure/discomfort. ROS negative      Review of Systems:  CONSTITUTIONAL:  No weight loss, fever, chills, weakness or fatigue.  HEENT:  Eyes:  No visual loss, blurred vision, double vision or yellow sclerae. Ears, Nose, Throat:  No hearing loss, sneezing, congestion, runny nose or sore throat.  SKIN:  No rash or itching.  CARDIOVASCULAR:  No chest pain, chest pressure or chest discomfort. No palpitations or edema.  RESPIRATORY:  No shortness of breath, cough or sputum.  GASTROINTESTINAL:  No anorexia, nausea, vomiting or diarrhea. No abdominal pain or blood.  GENITOURINARY: No Burning on urination.   NEUROLOGICAL:  see HPI  MUSCULOSKELETAL:  No muscle, back pain, joint pain or stiffness.  HEMATOLOGIC:  No anemia, bleeding or bruising.  LYMPHATICS:  No enlarged nodes. No history of splenectomy.  PSYCHIATRIC:  No history of depression or anxiety.  ENDOCRINOLOGIC:  No reports of sweating, cold or heat intolerance. No polyuria or polydipsia.  ALLERGIES:  No history of asthma, hives, eczema or rhinitis.    PAST MEDICAL & SURGICAL HISTORY:  No pertinent past medical history      No significant past surgical history        SOCIAL HISTORY:  FAMILY HISTORY:    ALLERGIES: 	  Allergy Status Unknown          MEDICATIONS:  metoprolol tartrate 25 milliGRAM(s) Oral every 12 hours  pantoprazole Infusion 8 mG/Hr IV Continuous <Continuous>      T(C): 36.6 (08-05-23 @ 09:57), Max: 37.6 (08-04-23 @ 15:53)  HR: 112 (08-05-23 @ 08:24) (98 - 143)  BP: 114/58 (08-05-23 @ 08:24) (104/60 - 133/78)  RR: 17 (08-05-23 @ 08:24) (16 - 20)  SpO2: 100% (08-05-23 @ 08:24) (98% - 100%)    08-04-23 @ 07:01  -  08-05-23 @ 07:00  --------------------------------------------------------  IN: 1070 mL / OUT: 650 mL / NET: 420 mL    08-05-23 @ 07:01  -  08-05-23 @ 13:55  --------------------------------------------------------  IN: 10 mL / OUT: 700 mL / NET: -690 mL        PHYSICAL EXAM:    Constitutional: resting comfortably in bed; NAD  HEENT: NC/AT, PERRL, EOMI, anicteric sclera, no nasal discharge; uvula midline, no oropharyngeal erythema or exudates; MMM  Neck: supple; no thyromegaly, JVP <8 cm H20, JVD -  Respiratory: CTA B/L; no W/R/R, no retractions  Cardiac: +S1/S2; regularly regular tachycardic; no M/R/G; PMI non-displaced  Gastrointestinal: soft, NT/ND; no rebound or guarding; +BSx4  Extremities: WWP, no clubbing or cyanosis; no peripheral edema  Musculoskeletal: NROM x4; no joint swelling, tenderness or erythema  Vascular: 2+ radial, DP/PT pulses B/L  Dermatologic: skin warm, dry and intact; no rashes, wounds, or scars  Lymphatic: no submandibular or cervical LAD  Neurologic: AAOx3; CNII-XII grossly intact; no focal deficits        I&O's Summary    04 Aug 2023 07:01  -  05 Aug 2023 07:00  --------------------------------------------------------  IN: 1070 mL / OUT: 650 mL / NET: 420 mL    05 Aug 2023 07:01  -  05 Aug 2023 13:55  --------------------------------------------------------  IN: 10 mL / OUT: 700 mL / NET: -690 mL        Weight (kg): 94.1 (08-04 @ 15:53)  LABS:	 	                        8.6    7.78  )-----------( 133      ( 05 Aug 2023 12:00 )             25.4     08-05    140  |  107  |  12  ----------------------------<  106<H>  3.7   |  25  |  0.88    Ca    7.7<L>      05 Aug 2023 07:32  Phos  3.0     08-05  Mg     2.0     08-05    TPro  5.5<L>  /  Alb  3.5  /  TBili  0.3  /  DBili  x   /  AST  16  /  ALT  10  /  AlkPhos  33<L>  08-05

## 2023-08-05 NOTE — PROGRESS NOTE ADULT - PROBLEM SELECTOR PLAN 3
F: S/p 3L of IVF  E: Replete if K <4, P <3, Mg <2  D: Full Liquids  GI ppx: Protonix infusion   DVT ppx: OOB  Dispo: Tele

## 2023-08-05 NOTE — H&P ADULT - PROBLEM SELECTOR PLAN 1
#Symptomatic anemia   Pt presented with multiple episodes of melena for 2 days. Pt presented to the ED with tachycardia. S/p 2L of NS IV bolus and Protonix 80mg IV x1 in the ED. Pt is still tachycardic and his BUN is 27 while Cr is wnl, Hb of 8.3 on admission, was transfused 1U of pRBCs with repeat Hb of 8.5, indicating that there is an ongoing GIB.   - Started Protonix 80mg infusion   - Gave 1L LR IV bolus x1  - GI consult in am (pt is NPO for possible endoscopy)  - Continue to monitor vital signs  - Monitor for signs or symptoms of bleeding  - Maintain active T&S and transfuse as needed (if Hb<7 or if pt is having symptomatic anemia)  - F/u repeat CBC

## 2023-08-05 NOTE — H&P ADULT - ASSESSMENT
53M w/ no known PMHx presents to [facility] ED for multiple episodes of melena, was admitted to tele for further management and monitoring of acute upper GIB.  53 year old M w/no known PMHx presents to [facility] ED for multiple episodes of melena, was admitted to [facility] for further management and monitoring of acute UGIB.

## 2023-08-05 NOTE — H&P ADULT - PROBLEM SELECTOR PLAN 2
F: S/p 3L of IVF  E: Replete if K <4, P <3, Mg <2  D: NPO  GI ppx: Protonix infusion   DVT ppx: None due to GIB  Dispo: Tele

## 2023-08-05 NOTE — CONSULT NOTE ADULT - ASSESSMENT
53yM  w/no known PMHx presents to [facility] ED for multiple episodes of melena, was admitted to tele for further management and monitoring of acute UGIB. His hospital course c/b a tachyarthymia on telemetry       Review of Studies:  ECG 8/5: Sinus tachycardia     Recommendations:    #Tachyarrhythmia   -Tele:   -Agree w/lopressor PO 12.5mg BID w/holding parameters SBP <110 and/or HR <60  -Please obtain TTE w/dopplers   -Maintain Mg>2 and K >4      Recommendations above are preliminary pending final attestation by an attending cardiologist  Plan was discussed with primary team  We'll continue to follow, thank you for the consultation      Kevin Keller ([id number])  Cardiovascular Disease Fellow  Consult Pager: [phone]         53yM  w/no known PMHx presents to [facility] ED for multiple episodes of melena, was admitted to tele for further management and monitoring of acute UGIB. His hospital course c/b a tachyrhythmia on telemetry likely sinus tachycardia 2/2 acute drop in hgb w/physiologic compensation.      Review of Studies:  ECG 8/5: Sinus tachycardia   Tele 8/4-8/5: Sinus tach     Recommendations:    #Tachyarrhythmia   -Sinus tachycardia w/no evidence of afib/aflutter on tele  -Subclinical hypothyroidism on TFTs, denies pain, euvolemic on exam  -Recommend d/c BB in the setting of GIB, ddx for sinus tach includes PE however low likelihood given low Wells score  -Please obtain TTE w/dopplers   -Maintain Mg>2 and K >4      #Perioperative Cardiovascular Risk Assessment  -RCRI 0, Fernandez 0 risk for MI or cardiac arrest during the procedure and up to 30d  -No evidence of ACS, decompensated HF, severe AS, and tachyarrhythmia on clinical and physical exam assessment  -Had 4> METs of exercise tolerance prior to the hospitalization  -No further cardiac w/u required, pt is deemed cardiovascularly optimized for planned EGD/Cscope procedure and may proceed  -Deemed low risk of perioperative cardiovascular complications for an low risk procedure      Recommendations above are preliminary pending final attestation by an attending cardiologist  Plan was discussed with primary team  We'll continue to follow, thank you for the consultation      Kevin Keller ([id number])  Cardiovascular Disease Fellow  Consult Pager: [phone]

## 2023-08-05 NOTE — H&P ADULT - NSHPPHYSICALEXAM_GEN_ALL_CORE
.  VITAL SIGNS:  T(C): 37.3 (08-04-23 @ 23:41), Max: 37.6 (08-04-23 @ 15:53)  T(F): 99.1 (08-04-23 @ 23:41), Max: 99.7 (08-04-23 @ 15:53)  HR: 114 (08-04-23 @ 23:41) (110 - 143)  BP: 116/71 (08-04-23 @ 23:41) (104/60 - 133/78)  BP(mean): 89 (08-04-23 @ 23:41) (89 - 89)  RR: 20 (08-04-23 @ 23:41) (16 - 20)  SpO2: 100% (08-04-23 @ 23:41) (98% - 100%)  Wt(kg): --    PHYSICAL EXAM:    General: NAD, laying in bed, speaking in full sentences  HEENT: head NC/AT, no conjunctival injection, EOMI, dry MM, no dried/alberto blood seen   Neck: supple  Cardio: +tachycardic, +S1/S2, no M/R/G  Resp: lungs CTAB, no cough/wheezes/rales/rhonchi, on room air  Abdo: soft, NT, ND, +bowel sounds x4, no organomegaly or palpable mass  Extremities: WWP, no edema/cyanosis/clubbing   Vasc: 2+ radial pulses b/l  Neuro: A&Ox3, no focal deficits  Psych: speech non-pressured, thoughts goal-oriented

## 2023-08-05 NOTE — H&P ADULT - NSHPLABSRESULTS_GEN_ALL_CORE
8.5    10.02 )-----------( 144      ( 04 Aug 2023 22:39 )             24.8       08-04    138  |  106  |  27<H>  ----------------------------<  144<H>  3.6   |  26  |  0.92    Ca    8.6      04 Aug 2023 16:15    TPro  5.8<L>  /  Alb  3.5  /  TBili  <0.2  /  DBili  x   /  AST  15  /  ALT  12  /  AlkPhos  37<L>  08-04              Urinalysis Basic - ( 04 Aug 2023 16:15 )    Color: x / Appearance: x / SG: x / pH: x  Gluc: 144 mg/dL / Ketone: x  / Bili: x / Urobili: x   Blood: x / Protein: x / Nitrite: x   Leuk Esterase: x / RBC: x / WBC x   Sq Epi: x / Non Sq Epi: x / Bacteria: x        PT/INR - ( 04 Aug 2023 16:15 )   PT: 12.4 sec;   INR: 1.09          PTT - ( 04 Aug 2023 16:15 )  PTT:28.3 sec    Lactate Trend  08-04 @ 20:05 Lactate:1.0             CAPILLARY BLOOD GLUCOSE      POCT Blood Glucose.: 170 mg/dL (04 Aug 2023 15:52)

## 2023-08-05 NOTE — H&P ADULT - NSHPSOCIALHISTORY_GEN_ALL_CORE
Never used tobacco  Drinks 1-2 beers (Heineken) every 2-3 days for last 30 years, last drink was on 8/2  No recreational drug use  Lives with wife and 3 children  No pets

## 2023-08-05 NOTE — CONSULT NOTE ADULT - ASSESSMENT
52 yo M , no apparent PMHx , pw melena.     Labs notable for normocytic anemia, improving BUN:Cr, and mild thrombocytopenia  HR remains in low 100's despite resuscitation   Low grade temps unusual for GI bleeding    Recommendations:  Ok for full liquid diet  Please send Viral Hepatitis Panel - including Hep B surface Ab and Core Ab Total, HIV screen  Cont PPI Infusion today  Obtain US of Abd with doppler  Start IV maintenance fluids   send iron b12 folate levels - noted 1 u prbc administered , may benefit from IV iron  Send LDH and Haptoglobin  Tentative EGD 8/7/23 , NPO MN 8/6/23    Thank you for the courtesy of this consult. We will follow along with you.    Rajan Cheng M.D.  Gastroenterology Fellow  Weekday 7am-5pm Pager: [phone]  Weeknights/Weekend/Holiday Coverage: Please Call the  for contact info  Follow Up Questions welcome via [place] [employer] Teams Messenger

## 2023-08-05 NOTE — H&P ADULT - REASON FOR ADMISSION
Patient reports history of afib. Patient reports she believes she has been in and out of afib the last 2 weeks and was supposed to go to ED last week per cardiology but had improved so she did not come to ED. Patient reports SOB along with afib. Patient last had cardioversion about 1 year ago. On eliquis.   acute GIB acute UGIB

## 2023-08-06 LAB
ALBUMIN SERPL ELPH-MCNC: 3.7 G/DL — SIGNIFICANT CHANGE UP (ref 3.3–5)
ALP SERPL-CCNC: 37 U/L — LOW (ref 40–120)
ALT FLD-CCNC: 13 U/L — SIGNIFICANT CHANGE UP (ref 10–45)
ANION GAP SERPL CALC-SCNC: 6 MMOL/L — SIGNIFICANT CHANGE UP (ref 5–17)
AST SERPL-CCNC: 16 U/L — SIGNIFICANT CHANGE UP (ref 10–40)
BASOPHILS # BLD AUTO: 0.03 K/UL — SIGNIFICANT CHANGE UP (ref 0–0.2)
BASOPHILS NFR BLD AUTO: 0.4 % — SIGNIFICANT CHANGE UP (ref 0–2)
BILIRUB SERPL-MCNC: 0.3 MG/DL — SIGNIFICANT CHANGE UP (ref 0.2–1.2)
BUN SERPL-MCNC: 6 MG/DL — LOW (ref 7–23)
CALCIUM SERPL-MCNC: 8.4 MG/DL — SIGNIFICANT CHANGE UP (ref 8.4–10.5)
CHLORIDE SERPL-SCNC: 103 MMOL/L — SIGNIFICANT CHANGE UP (ref 96–108)
CO2 SERPL-SCNC: 27 MMOL/L — SIGNIFICANT CHANGE UP (ref 22–31)
CREAT SERPL-MCNC: 0.94 MG/DL — SIGNIFICANT CHANGE UP (ref 0.5–1.3)
EGFR: 96 ML/MIN/1.73M2 — SIGNIFICANT CHANGE UP
EOSINOPHIL # BLD AUTO: 0.32 K/UL — SIGNIFICANT CHANGE UP (ref 0–0.5)
EOSINOPHIL NFR BLD AUTO: 3.9 % — SIGNIFICANT CHANGE UP (ref 0–6)
GLUCOSE SERPL-MCNC: 98 MG/DL — SIGNIFICANT CHANGE UP (ref 70–99)
HAPTOGLOB SERPL-MCNC: 134 MG/DL — SIGNIFICANT CHANGE UP (ref 34–200)
HBV CORE IGM SER-ACNC: SIGNIFICANT CHANGE UP
HCT VFR BLD CALC: 28.3 % — LOW (ref 39–50)
HCT VFR BLD CALC: 29.8 % — LOW (ref 39–50)
HGB BLD-MCNC: 9.5 G/DL — LOW (ref 13–17)
HGB BLD-MCNC: 9.9 G/DL — LOW (ref 13–17)
IMM GRANULOCYTES NFR BLD AUTO: 0.2 % — SIGNIFICANT CHANGE UP (ref 0–0.9)
LDH SERPL L TO P-CCNC: 124 U/L — SIGNIFICANT CHANGE UP (ref 50–242)
LYMPHOCYTES # BLD AUTO: 2.55 K/UL — SIGNIFICANT CHANGE UP (ref 1–3.3)
LYMPHOCYTES # BLD AUTO: 30.8 % — SIGNIFICANT CHANGE UP (ref 13–44)
MAGNESIUM SERPL-MCNC: 2.3 MG/DL — SIGNIFICANT CHANGE UP (ref 1.6–2.6)
MCHC RBC-ENTMCNC: 29.4 PG — SIGNIFICANT CHANGE UP (ref 27–34)
MCHC RBC-ENTMCNC: 29.6 PG — SIGNIFICANT CHANGE UP (ref 27–34)
MCHC RBC-ENTMCNC: 33.2 GM/DL — SIGNIFICANT CHANGE UP (ref 32–36)
MCHC RBC-ENTMCNC: 33.6 GM/DL — SIGNIFICANT CHANGE UP (ref 32–36)
MCV RBC AUTO: 88.2 FL — SIGNIFICANT CHANGE UP (ref 80–100)
MCV RBC AUTO: 88.4 FL — SIGNIFICANT CHANGE UP (ref 80–100)
MONOCYTES # BLD AUTO: 0.54 K/UL — SIGNIFICANT CHANGE UP (ref 0–0.9)
MONOCYTES NFR BLD AUTO: 6.5 % — SIGNIFICANT CHANGE UP (ref 2–14)
NEUTROPHILS # BLD AUTO: 4.83 K/UL — SIGNIFICANT CHANGE UP (ref 1.8–7.4)
NEUTROPHILS NFR BLD AUTO: 58.2 % — SIGNIFICANT CHANGE UP (ref 43–77)
NRBC # BLD: 0 /100 WBCS — SIGNIFICANT CHANGE UP (ref 0–0)
NRBC # BLD: 0 /100 WBCS — SIGNIFICANT CHANGE UP (ref 0–0)
PHOSPHATE SERPL-MCNC: 3.7 MG/DL — SIGNIFICANT CHANGE UP (ref 2.5–4.5)
PLATELET # BLD AUTO: 138 K/UL — LOW (ref 150–400)
PLATELET # BLD AUTO: 146 K/UL — LOW (ref 150–400)
POTASSIUM SERPL-MCNC: 3.5 MMOL/L — SIGNIFICANT CHANGE UP (ref 3.5–5.3)
POTASSIUM SERPL-SCNC: 3.5 MMOL/L — SIGNIFICANT CHANGE UP (ref 3.5–5.3)
PROT SERPL-MCNC: 5.8 G/DL — LOW (ref 6–8.3)
RBC # BLD: 3.21 M/UL — LOW (ref 4.2–5.8)
RBC # BLD: 3.37 M/UL — LOW (ref 4.2–5.8)
RBC # FLD: 13.4 % — SIGNIFICANT CHANGE UP (ref 10.3–14.5)
RBC # FLD: 13.5 % — SIGNIFICANT CHANGE UP (ref 10.3–14.5)
SODIUM SERPL-SCNC: 136 MMOL/L — SIGNIFICANT CHANGE UP (ref 135–145)
T4 FREE SERPL-MCNC: 1.09 NG/DL — SIGNIFICANT CHANGE UP (ref 0.93–1.7)
TSH SERPL-MCNC: 4.93 UIU/ML — HIGH (ref 0.27–4.2)
VIT B12 SERPL-MCNC: 1184 PG/ML — SIGNIFICANT CHANGE UP (ref 232–1245)
WBC # BLD: 8.29 K/UL — SIGNIFICANT CHANGE UP (ref 3.8–10.5)
WBC # BLD: 8.4 K/UL — SIGNIFICANT CHANGE UP (ref 3.8–10.5)
WBC # FLD AUTO: 8.29 K/UL — SIGNIFICANT CHANGE UP (ref 3.8–10.5)
WBC # FLD AUTO: 8.4 K/UL — SIGNIFICANT CHANGE UP (ref 3.8–10.5)

## 2023-08-06 PROCEDURE — 93975 VASCULAR STUDY: CPT | Mod: 26

## 2023-08-06 PROCEDURE — 99233 SBSQ HOSP IP/OBS HIGH 50: CPT | Mod: GC

## 2023-08-06 RX ORDER — METOPROLOL TARTRATE 50 MG
12.5 TABLET ORAL EVERY 12 HOURS
Refills: 0 | Status: DISCONTINUED | OUTPATIENT
Start: 2023-08-07 | End: 2023-08-07

## 2023-08-06 RX ORDER — SODIUM CHLORIDE 9 MG/ML
500 INJECTION, SOLUTION INTRAVENOUS ONCE
Refills: 0 | Status: COMPLETED | OUTPATIENT
Start: 2023-08-06 | End: 2023-08-06

## 2023-08-06 RX ORDER — POTASSIUM CHLORIDE 20 MEQ
40 PACKET (EA) ORAL ONCE
Refills: 0 | Status: COMPLETED | OUTPATIENT
Start: 2023-08-06 | End: 2023-08-06

## 2023-08-06 RX ORDER — POTASSIUM CHLORIDE 20 MEQ
40 PACKET (EA) ORAL ONCE
Refills: 0 | Status: DISCONTINUED | OUTPATIENT
Start: 2023-08-06 | End: 2023-08-06

## 2023-08-06 RX ORDER — SODIUM CHLORIDE 9 MG/ML
500 INJECTION, SOLUTION INTRAVENOUS
Refills: 0 | Status: DISCONTINUED | OUTPATIENT
Start: 2023-08-06 | End: 2023-08-06

## 2023-08-06 RX ORDER — PANTOPRAZOLE SODIUM 20 MG/1
40 TABLET, DELAYED RELEASE ORAL EVERY 12 HOURS
Refills: 0 | Status: DISCONTINUED | OUTPATIENT
Start: 2023-08-06 | End: 2023-08-07

## 2023-08-06 RX ORDER — SODIUM CHLORIDE 9 MG/ML
1000 INJECTION, SOLUTION INTRAVENOUS
Refills: 0 | Status: DISCONTINUED | OUTPATIENT
Start: 2023-08-06 | End: 2023-08-06

## 2023-08-06 RX ADMIN — SODIUM CHLORIDE 999 MILLILITER(S): 9 INJECTION, SOLUTION INTRAVENOUS at 12:27

## 2023-08-06 RX ADMIN — Medication 40 MILLIEQUIVALENT(S): at 09:26

## 2023-08-06 RX ADMIN — PANTOPRAZOLE SODIUM 10 MG/HR: 20 TABLET, DELAYED RELEASE ORAL at 09:26

## 2023-08-06 RX ADMIN — SODIUM CHLORIDE 500 MILLILITER(S): 9 INJECTION, SOLUTION INTRAVENOUS at 13:19

## 2023-08-06 RX ADMIN — PANTOPRAZOLE SODIUM 40 MILLIGRAM(S): 20 TABLET, DELAYED RELEASE ORAL at 19:52

## 2023-08-06 RX ADMIN — Medication 25 MILLIGRAM(S): at 13:18

## 2023-08-06 NOTE — PROGRESS NOTE ADULT - SUBJECTIVE AND OBJECTIVE BOX
Transfer to [facility]    HPI:  53 year old M w/ no known PMHx presents to [facility] ED for episodes of symptomatic melena. Patient had 3 episodes of melena on 8/4 and one episode earlier today with associated lightheadedness, prompting the ED visit. Patient recently emigrated from [place] 2-3 weeks ago and has had poor medical follow-up previously. Patient endorsed recently having more dyspepsia, however denied GERD, chest or abdominal discomfort. Takes PRN Tylenol as needed. Denies AC, NSAIDs, iron supplements or smoking. Endorsed drinking 1-2 beers (Heineken) every 2-3 days for the last 30 years. No previous known history of cirrhosis and no personal/family history of CRC. Never had EGD/C-scope. In the ED, pt was tachycardic to 143, and labs significant for hgb 8.3, BUN 27. Abdominal xray negative for obstruction     ED course:   Initial vital signs: T: 99.7F, HR: 143, BP: 133/77, R: 18, SpO2: 98% on RA  Labs: significant for Hb 8.3, Hct 24.5, MCV 87.8, BUN 27.   XR abdomen: Nonobstructive bowel gas pattern. No free air.  CT angio abdomen: No evidence of active gastrointestinal hemorrhage.  EKG: Sinus tachycardia at 143 bpm, VA interval 118 ms, QRS duration 74 ms, QT/QTc 334/515 ms   Medications: Tylenol 1g IV x1, Protonix 80mg IV x1, 2L NS IV bolus  Consults: ICU O/N Events: pt was hypotensive and lopressor was held.    Subjective/ROS: Patient seen and examined at bedside. Pt denies any headache, dizziness, lightheadedness, fever/Chills, HA, CP, SOB, n/v, changes in bowel/urinary habits.  12pt ROS otherwise negative.    VITALS  Vital Signs Last 24 Hrs  T(C): 37.1 (06 Aug 2023 14:25), Max: 37.3 (06 Aug 2023 01:00)  T(F): 98.7 (06 Aug 2023 14:25), Max: 99.2 (06 Aug 2023 06:54)  HR: 96 (06 Aug 2023 13:15) (76 - 122)  BP: 117/57 (06 Aug 2023 13:15) (91/55 - 134/59)  BP(mean): 81 (06 Aug 2023 13:15) (67 - 91)  RR: 18 (06 Aug 2023 13:15) (16 - 18)  SpO2: 100% (06 Aug 2023 13:15) (98% - 100%)    Parameters below as of 06 Aug 2023 13:15  Patient On (Oxygen Delivery Method): room air        CAPILLARY BLOOD GLUCOSE          PHYSICAL EXAM  General: NAD  Head: NC/AT; MMM; PERRL; EOMI;  Neck: Supple; no JVD  Respiratory: CTAB; no wheezes/rales/rhonchi  Cardiovascular: tachycardic; S1/S2+, no murmurs, rubs gallops   Gastrointestinal: Soft; NTND; bowel sounds normal and present  Extremities: WWP; no edema/cyanosis  Neurological: A&Ox3, CNII-XII grossly intact; no obvious focal deficits  vascular: 2+ pulses    MEDICATIONS  (STANDING):  pantoprazole Infusion 8 mG/Hr (10 mL/Hr) IV Continuous <Continuous>    MEDICATIONS  (PRN):      Allergy Status Unknown      LABS                        9.5    8.29  )-----------( 138      ( 06 Aug 2023 05:30 )             28.3     08-06    136  |  103  |  6<L>  ----------------------------<  98  3.5   |  27  |  0.94    Ca    8.4      06 Aug 2023 05:30  Phos  3.7     08-06  Mg     2.3     08-06    TPro  5.8<L>  /  Alb  3.7  /  TBili  0.3  /  DBili  x   /  AST  16  /  ALT  13  /  AlkPhos  37<L>  08-06      Urinalysis Basic - ( 06 Aug 2023 05:30 )    Color: x / Appearance: x / SG: x / pH: x  Gluc: 98 mg/dL / Ketone: x  / Bili: x / Urobili: x   Blood: x / Protein: x / Nitrite: x   Leuk Esterase: x / RBC: x / WBC x   Sq Epi: x / Non Sq Epi: x / Bacteria: x              IMAGING/EKG/ETC

## 2023-08-06 NOTE — PROGRESS NOTE ADULT - ATTENDING COMMENTS
Pt remains with stable Hb and BP and excellent urine output. This suggests tachycardia is not related to bleeding. TSH elevated and T4 normal T3 sent and Endocrine consulted. Continue low dose beta blocker and plan for EGD.

## 2023-08-06 NOTE — PROGRESS NOTE ADULT - PROBLEM SELECTOR PLAN 1
#UGIB  Pt presented with multiple episodes of melena for 2 days. Pt presented to the ED with tachycardia. S/p 2L of NS IV bolus and Protonix 80mg IV x1 in the ED. Pt is still tachycardic and his BUN is 27 while Cr is wnl, Hb of 8.3 on admission. Gi consulted. Now s/p 2 units pRBC   - F/u GI recs  - Protonix drip 8 mg/hr x 10 hrs  - Monitor for signs or symptoms of bleeding  - Maintain active T&S and transfuse as needed (if Hb<7 or if pt is having symptomatic anemia)  - F/u repeat CBC  - F/u iron studies, B12, folate, LDH, haptoglobin  - Advance diet to full liquids  - Tentative EGD on Monday 8/7   - NPO on midnight before scope
#UGIB  Pt presented with multiple episodes of melena for 2 days. Pt presented to the ED with tachycardia. S/p 2L of NS IV bolus and Protonix 80mg IV x1 in the ED. Pt is still tachycardic and his BUN is 27 while Cr is wnl, Hb of 8.3 on admission. Gi consulted. Now s/p 2 units pRBC. Iron studies normal. B12 level 1184, haptoglobin 134, .   - F/u GI recs  - Protonix drip 8 mg/hr x 10 hrs  - Monitor for signs or symptoms of bleeding  - Maintain active T&S and transfuse as needed (if Hb<7 or if pt is having symptomatic anemia)  - F/u folate  - Advance diet to full liquids  - Tentative EGD on Monday 8/7   - NPO on midnight before scope

## 2023-08-06 NOTE — PROGRESS NOTE ADULT - PROBLEM SELECTOR PLAN 2
Sinus tachycardia w/no evidence of afib/aflutter on tele. Likely reactive in settign of UGIB. ddx for sinus tach includes PE however low likelihood given low Wells score. s/p lopressor 5 mg IV x1  - f/u cards recs  - lopressor 25 BID  - f/u LE doppler  - f/u TTE w/dopplers
Sinus tachycardia w/no evidence of afib/aflutter on tele. Likely reactive in settign of UGIB. ddx for sinus tach includes PE however low likelihood given low Wells score. s/p lopressor 5 mg IV x1. TSH 10.9-->4.9  - f/u cards recs  - lopressor 25 BID  - f/u LE doppler  - f/u TTE w/dopplers  - f/u AM TSH and T4 tomorrow AM, consider AM cortisol

## 2023-08-07 ENCOUNTER — TRANSCRIPTION ENCOUNTER (OUTPATIENT)
Age: 53
End: 2023-08-07

## 2023-08-07 VITALS
RESPIRATION RATE: 15 BRPM | SYSTOLIC BLOOD PRESSURE: 117 MMHG | HEART RATE: 92 BPM | DIASTOLIC BLOOD PRESSURE: 56 MMHG | OXYGEN SATURATION: 100 %

## 2023-08-07 LAB
ANION GAP SERPL CALC-SCNC: 5 MMOL/L — SIGNIFICANT CHANGE UP (ref 5–17)
BASOPHILS # BLD AUTO: 0.02 K/UL — SIGNIFICANT CHANGE UP (ref 0–0.2)
BASOPHILS NFR BLD AUTO: 0.2 % — SIGNIFICANT CHANGE UP (ref 0–2)
BUN SERPL-MCNC: 8 MG/DL — SIGNIFICANT CHANGE UP (ref 7–23)
CALCIUM SERPL-MCNC: 8.8 MG/DL — SIGNIFICANT CHANGE UP (ref 8.4–10.5)
CHLORIDE SERPL-SCNC: 107 MMOL/L — SIGNIFICANT CHANGE UP (ref 96–108)
CO2 SERPL-SCNC: 28 MMOL/L — SIGNIFICANT CHANGE UP (ref 22–31)
CREAT SERPL-MCNC: 0.98 MG/DL — SIGNIFICANT CHANGE UP (ref 0.5–1.3)
EGFR: 92 ML/MIN/1.73M2 — SIGNIFICANT CHANGE UP
EOSINOPHIL # BLD AUTO: 0.26 K/UL — SIGNIFICANT CHANGE UP (ref 0–0.5)
EOSINOPHIL NFR BLD AUTO: 2.9 % — SIGNIFICANT CHANGE UP (ref 0–6)
FOLATE SERPL-MCNC: 16.7 NG/ML — SIGNIFICANT CHANGE UP
GLUCOSE SERPL-MCNC: 94 MG/DL — SIGNIFICANT CHANGE UP (ref 70–99)
HCT VFR BLD CALC: 28.7 % — LOW (ref 39–50)
HGB BLD-MCNC: 9.2 G/DL — LOW (ref 13–17)
IMM GRANULOCYTES NFR BLD AUTO: 0.2 % — SIGNIFICANT CHANGE UP (ref 0–0.9)
LYMPHOCYTES # BLD AUTO: 2.34 K/UL — SIGNIFICANT CHANGE UP (ref 1–3.3)
LYMPHOCYTES # BLD AUTO: 26.1 % — SIGNIFICANT CHANGE UP (ref 13–44)
MAGNESIUM SERPL-MCNC: 2.3 MG/DL — SIGNIFICANT CHANGE UP (ref 1.6–2.6)
MCHC RBC-ENTMCNC: 29.1 PG — SIGNIFICANT CHANGE UP (ref 27–34)
MCHC RBC-ENTMCNC: 32.1 GM/DL — SIGNIFICANT CHANGE UP (ref 32–36)
MCV RBC AUTO: 90.8 FL — SIGNIFICANT CHANGE UP (ref 80–100)
MONOCYTES # BLD AUTO: 0.7 K/UL — SIGNIFICANT CHANGE UP (ref 0–0.9)
MONOCYTES NFR BLD AUTO: 7.8 % — SIGNIFICANT CHANGE UP (ref 2–14)
NEUTROPHILS # BLD AUTO: 5.62 K/UL — SIGNIFICANT CHANGE UP (ref 1.8–7.4)
NEUTROPHILS NFR BLD AUTO: 62.8 % — SIGNIFICANT CHANGE UP (ref 43–77)
NRBC # BLD: 0 /100 WBCS — SIGNIFICANT CHANGE UP (ref 0–0)
PHOSPHATE SERPL-MCNC: 4 MG/DL — SIGNIFICANT CHANGE UP (ref 2.5–4.5)
PLATELET # BLD AUTO: 176 K/UL — SIGNIFICANT CHANGE UP (ref 150–400)
POTASSIUM SERPL-MCNC: 3.8 MMOL/L — SIGNIFICANT CHANGE UP (ref 3.5–5.3)
POTASSIUM SERPL-SCNC: 3.8 MMOL/L — SIGNIFICANT CHANGE UP (ref 3.5–5.3)
RBC # BLD: 3.16 M/UL — LOW (ref 4.2–5.8)
RBC # FLD: 13.3 % — SIGNIFICANT CHANGE UP (ref 10.3–14.5)
SODIUM SERPL-SCNC: 140 MMOL/L — SIGNIFICANT CHANGE UP (ref 135–145)
T4 FREE SERPL-MCNC: 1.16 NG/DL — SIGNIFICANT CHANGE UP (ref 0.93–1.7)
TSH SERPL-MCNC: 4.46 UIU/ML — HIGH (ref 0.27–4.2)
WBC # BLD: 8.96 K/UL — SIGNIFICANT CHANGE UP (ref 3.8–10.5)
WBC # FLD AUTO: 8.96 K/UL — SIGNIFICANT CHANGE UP (ref 3.8–10.5)

## 2023-08-07 PROCEDURE — 99238 HOSP IP/OBS DSCHRG MGMT 30/<: CPT | Mod: GC

## 2023-08-07 PROCEDURE — 82607 VITAMIN B-12: CPT

## 2023-08-07 PROCEDURE — 81003 URINALYSIS AUTO W/O SCOPE: CPT

## 2023-08-07 PROCEDURE — 83540 ASSAY OF IRON: CPT

## 2023-08-07 PROCEDURE — 83550 IRON BINDING TEST: CPT

## 2023-08-07 PROCEDURE — 84443 ASSAY THYROID STIM HORMONE: CPT

## 2023-08-07 PROCEDURE — 84466 ASSAY OF TRANSFERRIN: CPT

## 2023-08-07 PROCEDURE — 80074 ACUTE HEPATITIS PANEL: CPT

## 2023-08-07 PROCEDURE — 85025 COMPLETE CBC W/AUTO DIFF WBC: CPT

## 2023-08-07 PROCEDURE — 74018 RADEX ABDOMEN 1 VIEW: CPT

## 2023-08-07 PROCEDURE — 84100 ASSAY OF PHOSPHORUS: CPT

## 2023-08-07 PROCEDURE — 85730 THROMBOPLASTIN TIME PARTIAL: CPT

## 2023-08-07 PROCEDURE — 36430 TRANSFUSION BLD/BLD COMPNT: CPT

## 2023-08-07 PROCEDURE — 93970 EXTREMITY STUDY: CPT

## 2023-08-07 PROCEDURE — 99291 CRITICAL CARE FIRST HOUR: CPT | Mod: 25

## 2023-08-07 PROCEDURE — 83010 ASSAY OF HAPTOGLOBIN QUANT: CPT

## 2023-08-07 PROCEDURE — 88305 TISSUE EXAM BY PATHOLOGIST: CPT

## 2023-08-07 PROCEDURE — 80053 COMPREHEN METABOLIC PANEL: CPT

## 2023-08-07 PROCEDURE — 86850 RBC ANTIBODY SCREEN: CPT

## 2023-08-07 PROCEDURE — 93975 VASCULAR STUDY: CPT

## 2023-08-07 PROCEDURE — 82746 ASSAY OF FOLIC ACID SERUM: CPT

## 2023-08-07 PROCEDURE — 83615 LACTATE (LD) (LDH) ENZYME: CPT

## 2023-08-07 PROCEDURE — 86705 HEP B CORE ANTIBODY IGM: CPT

## 2023-08-07 PROCEDURE — 83605 ASSAY OF LACTIC ACID: CPT

## 2023-08-07 PROCEDURE — 84439 ASSAY OF FREE THYROXINE: CPT

## 2023-08-07 PROCEDURE — 82272 OCCULT BLD FECES 1-3 TESTS: CPT

## 2023-08-07 PROCEDURE — 82962 GLUCOSE BLOOD TEST: CPT

## 2023-08-07 PROCEDURE — 86901 BLOOD TYPING SEROLOGIC RH(D): CPT

## 2023-08-07 PROCEDURE — 82728 ASSAY OF FERRITIN: CPT

## 2023-08-07 PROCEDURE — 74174 CTA ABD&PLVS W/CONTRAST: CPT

## 2023-08-07 PROCEDURE — 0225U NFCT DS DNA&RNA 21 SARSCOV2: CPT

## 2023-08-07 PROCEDURE — 88305 TISSUE EXAM BY PATHOLOGIST: CPT | Mod: 26

## 2023-08-07 PROCEDURE — 80048 BASIC METABOLIC PNL TOTAL CA: CPT

## 2023-08-07 PROCEDURE — 36415 COLL VENOUS BLD VENIPUNCTURE: CPT

## 2023-08-07 PROCEDURE — 85027 COMPLETE CBC AUTOMATED: CPT

## 2023-08-07 PROCEDURE — 43239 EGD BIOPSY SINGLE/MULTIPLE: CPT | Mod: GC

## 2023-08-07 PROCEDURE — 96365 THER/PROPH/DIAG IV INF INIT: CPT

## 2023-08-07 PROCEDURE — 85610 PROTHROMBIN TIME: CPT

## 2023-08-07 PROCEDURE — 86900 BLOOD TYPING SEROLOGIC ABO: CPT

## 2023-08-07 PROCEDURE — 96375 TX/PRO/DX INJ NEW DRUG ADDON: CPT

## 2023-08-07 PROCEDURE — 86923 COMPATIBILITY TEST ELECTRIC: CPT

## 2023-08-07 PROCEDURE — 83735 ASSAY OF MAGNESIUM: CPT

## 2023-08-07 PROCEDURE — P9016: CPT

## 2023-08-07 RX ORDER — PANTOPRAZOLE SODIUM 20 MG/1
1 TABLET, DELAYED RELEASE ORAL
Qty: 60 | Refills: 0
Start: 2023-08-07 | End: 2023-09-05

## 2023-08-07 RX ORDER — METOPROLOL TARTRATE 50 MG
0.5 TABLET ORAL
Qty: 30 | Refills: 0
Start: 2023-08-07 | End: 2023-09-05

## 2023-08-07 RX ORDER — SUCRALFATE 1 G
10 TABLET ORAL
Qty: 0 | Refills: 0 | DISCHARGE
Start: 2023-08-07

## 2023-08-07 RX ORDER — PANTOPRAZOLE SODIUM 20 MG/1
1 TABLET, DELAYED RELEASE ORAL
Qty: 0 | Refills: 0 | DISCHARGE
Start: 2023-08-07

## 2023-08-07 RX ORDER — SUCRALFATE 1 G
1 TABLET ORAL
Qty: 30 | Refills: 0
Start: 2023-08-07 | End: 2023-09-05

## 2023-08-07 RX ORDER — SUCRALFATE 1 G
10 TABLET ORAL
Qty: 1200 | Refills: 0
Start: 2023-08-07 | End: 2023-09-05

## 2023-08-07 RX ORDER — METOPROLOL TARTRATE 50 MG
1 TABLET ORAL
Qty: 60 | Refills: 0
Start: 2023-08-07 | End: 2023-09-05

## 2023-08-07 RX ORDER — SUCRALFATE 1 G
1 TABLET ORAL EVERY 6 HOURS
Refills: 0 | Status: DISCONTINUED | OUTPATIENT
Start: 2023-08-07 | End: 2023-08-07

## 2023-08-07 RX ORDER — PANTOPRAZOLE SODIUM 20 MG/1
40 TABLET, DELAYED RELEASE ORAL EVERY 12 HOURS
Refills: 0 | Status: DISCONTINUED | OUTPATIENT
Start: 2023-08-07 | End: 2023-08-07

## 2023-08-07 RX ORDER — METOPROLOL TARTRATE 50 MG
25 TABLET ORAL
Qty: 0 | Refills: 0 | DISCHARGE
Start: 2023-08-07

## 2023-08-07 RX ADMIN — Medication 12.5 MILLIGRAM(S): at 00:23

## 2023-08-07 RX ADMIN — PANTOPRAZOLE SODIUM 40 MILLIGRAM(S): 20 TABLET, DELAYED RELEASE ORAL at 06:35

## 2023-08-07 RX ADMIN — Medication 1 GRAM(S): at 12:35

## 2023-08-07 RX ADMIN — Medication 12.5 MILLIGRAM(S): at 12:35

## 2023-08-07 RX ADMIN — Medication 1 TABLET(S): at 12:35

## 2023-08-07 NOTE — PRE-ANESTHESIA EVALUATION ADULT - NSANTHPMHFT_GEN_ALL_CORE
History of Present Illness:   53 year old M w/ no known PMHx presents to [facility] ED for episodes of symptomatic melena. Patient had 3 episodes of melena yesterday and one episode earlier today with associated lightheadedness, prompting the ED visit. Patient recently emigrated from [place] 2-3 weeks ago and has had poor medical follow-up previously. Patient endorsed recently having more dyspepsia, however denied GERD, chest or abdominal discomfort. Takes PRN Tylenol as needed. Denies AC, NSAIDs, iron supplements or smoking. Endorsed drinking 1-2 beers (Heineken) every 2-3 days for the last 30 years. No previous known history of cirrhosis and no personal/family history of CRC. Never had EGD/C-scope. Denies any fever, chills, abdominal pain, n/v/d, chest pain.

## 2023-08-07 NOTE — DISCHARGE NOTE PROVIDER - HOSPITAL COURSE
#Discharge: do not delete    Patient    Problem List/Main Diagnoses (system-based):   Symptomatic anemia.   #UGIB  Pt presented with multiple episodes of melena for 2 days. Pt presented to the ED with tachycardia. S/p 2L of NS IV bolus and Protonix 80mg IV x1 in the ED. Pt is still tachycardic and his BUN is 27 while Cr is wnl, Hb of 8.3 on admission. Gi consulted. Now s/p 2 units pRBC. Iron studies normal. B12 level 1184, haptoglobin 134, .   - On discharge, start taking protonix 40mg twice a day, carafate 1g QID    Tachyarrhythmia.   Sinus tachycardia w/no evidence of afib/aflutter on tele. Likely reactive in settign of UGIB. ddx for sinus tach includes PE however low likelihood given low Wells score. s/p lopressor 5 mg IV x1. TSH 10.9-->4.9  - lopressor 25 BID  - cardiology followup as outpatient    Inpatient treatment course: Patient monitored on telemetry given tachycardia in setting of GI bleed, treated with IV protonix and liquid diet. Patient without recurrent episodes of hematemesis. EGD by GI performed on 8/7 showing erythema and petechiae in the proximal body to antrum compatible with non-erosive gastritis, and ulcer in duodenal bulb. Biopsies sent.    Patient was discharged to: Home    New medications: Protonix 40mg PO BID for 14 days, then once daily. Carafate suspension 1g PO QID. Lopressor 25mg BID.    Changes to old medications: None    Medications that were stopped: None    Items to Follow up as outpatient: Cardiology    Physical exam at time of discharge: #Discharge: do not delete    Pt is a 53 year old M w/no known PMHx presents to [facility] ED for multiple episodes of melena, was admitted to tele for further management and monitoring of acute UGIB and symptomatic anemia.    Problem List/Main Diagnoses (system-based):   Symptomatic anemia.   #UGIB  Pt presented with multiple episodes of melena for 2 days. Pt presented to the ED with tachycardia. S/p 2L of NS IV bolus and Protonix 80mg IV x1 in the ED. Pt is still tachycardic and his BUN is 27 while Cr is wnl, Hb of 8.3 on admission. Gi consulted. Now s/p 2 units pRBC. Iron studies normal. B12 level 1184, haptoglobin 134, .   - On discharge, start taking protonix 40mg twice a day, carafate 1g QID    Tachyarrhythmia.   Sinus tachycardia w/no evidence of afib/aflutter on tele. Likely reactive in settign of UGIB. ddx for sinus tach includes PE however low likelihood given low Wells score. s/p lopressor 5 mg IV x1. TSH 10.9-->4.9  - lopressor 25 BID  - cardiology followup as outpatient    Inpatient treatment course: Patient monitored on telemetry given tachycardia in setting of GI bleed, treated with IV protonix and liquid diet. Patient without recurrent episodes of hematemesis. EGD by GI performed on 8/7 showing erythema and petechiae in the proximal body to antrum compatible with non-erosive gastritis, and ulcer in duodenal bulb. Biopsies sent.    Patient was discharged to: Home    New medications: Protonix 40mg PO BID for 14 days, then once daily. Carafate suspension 1g PO QID. Lopressor 25mg BID.    Changes to old medications: None    Medications that were stopped: None    Items to Follow up as outpatient: Cardiology for sinus tachycardia, GI for UGIB.    Physical exam at time of discharge:   General: NAD  Head: NC/AT; MMM; PERRL; EOMI  Neck: Supple; no JVD  Respiratory: CTAB; no wheezes/rales/rhonchi  Cardiovascular: tachycardic; S1/S2+, no murmurs, rubs gallops   Gastrointestinal: Soft; NTND; bowel sounds normal and present  Extremities: WWP; no edema/cyanosis  Neurological: A&Ox3, CNII-XII grossly intact; no obvious focal deficits  vascular: 2+ pulses

## 2023-08-07 NOTE — PRE-ANESTHESIA EVALUATION ADULT - NSANTHOSAYNRD_GEN_A_CORE
No. ALYSE screening performed.  STOP BANG Legend: 0-2 = LOW Risk; 3-4 = INTERMEDIATE Risk; 5-8 = HIGH Risk

## 2023-08-07 NOTE — PROGRESS NOTE ADULT - ASSESSMENT
53 year old M w/no known PMHx presents to [facility] ED for multiple episodes of melena, was admitted to [facility] for further management and monitoring of acute UGIB. 
53yM  w/no known PMHx presents to [facility] ED for multiple episodes of melena, was admitted to tele for further management and monitoring of acute UGIB. His hospital course c/b a tachyrhythmia on telemetry likely sinus tachycardia 2/2 acute drop in hgb w/physiologic compensation.      Review of Studies:  ECG 8/5: Sinus tachycardia   Tele 8/4-8/5: Sinus tach     Recommendations:    #Sinus Tachycardia   -No indication of afib/aflutter on tele, solely episodic sinus tachycardia   -low probability of sinus tachycardia 2/2 PE, patient is asymptomatic and has low probability on Wells criteria   -Endoscopy procedure conducted this morning revealing for nonbleeding duodenal ulcer and nonerosive gastritis, Hgb has stabilized to 9.2 from 8.5  -f/u TTE w/dopplers       #Perioperative Optimization   -Pt was optimized for low risk procedure EGD, and is at low risk for complications during procedure   -had no active or previous history of ACS, valvular disease, arrhythmias or HF symptoms     
53 year old M w/no known PMHx presents to [facility] ED for multiple episodes of melena, was admitted to [facility] for further management and monitoring of acute UGIB.

## 2023-08-07 NOTE — DISCHARGE NOTE NURSING/CASE MANAGEMENT/SOCIAL WORK - PATIENT PORTAL LINK FT
You can access the FollowMyHealth Patient Portal offered by [facility] by registering at the following website: [email or web address]. By joining [employer]’s FollowMyHealth portal, you will also be able to view your health information using other applications (apps) compatible with our system.

## 2023-08-07 NOTE — CHART NOTE - NSCHARTNOTEFT_GEN_A_CORE
Patient is s/p EGD for melena with Dr. Jeffery.     Findings:   - Normal mucosa in the gastroesophageal junction.  - Erythema and petechiae in the proximal body > antrum compatible with non-erosive gastritis. Biopsies sent.   - Ulcer in the duodenal bulb.  - Erythema in the Duodenal bulb compatible with duodenitis.    Plan:	  - Protonix 40 mg po bid   - Carafate Suspension 1g po qid  - Advance diet as tolerated  - Return to floor for further management  - Await pathology results  - Arrange out-patient follow-up with Dr. Jose D Chandra D.O.   Gastroenterology Fellow  Weekday 7am-5pm Pager: [phone]

## 2023-08-07 NOTE — PROGRESS NOTE ADULT - SUBJECTIVE AND OBJECTIVE BOX
Cardiology Consult    O/N:  Interval History/HPI: Pt seen and examined at bedside, NAD, no complaints at this time. ROS s/f ?, remainder of ROS otherwise unremarkable   Telemetry:    OBJECTIVE  T(C): 36.8 (08-07-23 @ 10:46), Max: 37.6 (08-07-23 @ 01:00)  HR: 102 (08-07-23 @ 13:00) (78 - 102)  BP: 100/59 (08-07-23 @ 13:00) (96/51 - 113/56)  RR: 15 (08-07-23 @ 13:00) (15 - 18)  SpO2: 100% (08-07-23 @ 13:00) (96% - 100%)    08-06-23 @ 07:01  -  08-07-23 @ 07:00  --------------------------------------------------------  IN: 1050 mL / OUT: 3052 mL / NET: -2002 mL        PHYSICAL EXAM:    Constitutional: resting comfortably in bed; NAD  HEENT: NC/AT, PERRL, EOMI, anicteric sclera, no nasal discharge; uvula midline, no oropharyngeal erythema or exudates; MMM  Neck: supple; no thyromegaly, JVP ? cm H20, JVD +/-  Respiratory: CTA B/L; no W/R/R, no retractions  Cardiac: +S1/S2; RRR; no M/R/G; PMI non-displaced  Gastrointestinal: soft, NT/ND; no rebound or guarding; +BSx4  Extremities: WWP, no clubbing or cyanosis; no peripheral edema  Musculoskeletal: NROM x4; no joint swelling, tenderness or erythema  Vascular: 2+ radial, DP/PT pulses B/L  Dermatologic: skin warm, dry and intact; no rashes, wounds, or scars  Lymphatic: no submandibular or cervical LAD  Neurologic: AAOx3; CNII-XII grossly intact; no focal deficits    LABS:                        9.2    8.96  )-----------( 176      ( 07 Aug 2023 05:30 )             28.7     08-07    140  |  107  |  8   ----------------------------<  94  3.8   |  28  |  0.98    Ca    8.8      07 Aug 2023 05:30  Phos  4.0     08-07  Mg     2.3     08-07    TPro  5.8<L>  /  Alb  3.7  /  TBili  0.3  /  DBili  x   /  AST  16  /  ALT  13  /  AlkPhos  37<L>  08-06      Urinalysis Basic - ( 07 Aug 2023 05:30 )    Color: x / Appearance: x / SG: x / pH: x  Gluc: 94 mg/dL / Ketone: x  / Bili: x / Urobili: x   Blood: x / Protein: x / Nitrite: x   Leuk Esterase: x / RBC: x / WBC x   Sq Epi: x / Non Sq Epi: x / Bacteria: x        RADIOLOGY & ADDITIONAL TESTS:  Reviewed .    MEDICATIONS  (STANDING):  metoprolol tartrate 12.5 milliGRAM(s) Oral every 12 hours  multivitamin 1 Tablet(s) Oral every 24 hours  pantoprazole    Tablet 40 milliGRAM(s) Oral every 12 hours  sucralfate suspension 1 Gram(s) Oral every 6 hours    MEDICATIONS  (PRN):     Cardiology Consult    O/N:  Interval History/HPI: Pt seen and examined at bedside. Pt has episodes of tachycardia this morning  Telemetry:    OBJECTIVE  T(C): 36.8 (08-07-23 @ 10:46), Max: 37.6 (08-07-23 @ 01:00)  HR: 102 (08-07-23 @ 13:00) (78 - 102)  BP: 100/59 (08-07-23 @ 13:00) (96/51 - 113/56)  RR: 15 (08-07-23 @ 13:00) (15 - 18)  SpO2: 100% (08-07-23 @ 13:00) (96% - 100%)    08-06-23 @ 07:01  -  08-07-23 @ 07:00  --------------------------------------------------------  IN: 1050 mL / OUT: 3052 mL / NET: -2002 mL        PHYSICAL EXAM:    Constitutional: resting comfortably in bed; NAD  HEENT: NC/AT, PERRL, EOMI, anicteric sclera, no nasal discharge; uvula midline, no oropharyngeal erythema or exudates; MMM  Neck: supple; no thyromegaly, JVP ? cm H20, JVD +/-  Respiratory: CTA B/L; no W/R/R, no retractions  Cardiac: +S1/S2; RRR; no M/R/G; PMI non-displaced  Gastrointestinal: soft, NT/ND; no rebound or guarding; +BSx4  Extremities: WWP, no clubbing or cyanosis; no peripheral edema  Musculoskeletal: NROM x4; no joint swelling, tenderness or erythema  Vascular: 2+ radial, DP/PT pulses B/L  Dermatologic: skin warm, dry and intact; no rashes, wounds, or scars  Lymphatic: no submandibular or cervical LAD  Neurologic: AAOx3; CNII-XII grossly intact; no focal deficits    LABS:                        9.2    8.96  )-----------( 176      ( 07 Aug 2023 05:30 )             28.7     08-07    140  |  107  |  8   ----------------------------<  94  3.8   |  28  |  0.98    Ca    8.8      07 Aug 2023 05:30  Phos  4.0     08-07  Mg     2.3     08-07    TPro  5.8<L>  /  Alb  3.7  /  TBili  0.3  /  DBili  x   /  AST  16  /  ALT  13  /  AlkPhos  37<L>  08-06      Urinalysis Basic - ( 07 Aug 2023 05:30 )    Color: x / Appearance: x / SG: x / pH: x  Gluc: 94 mg/dL / Ketone: x  / Bili: x / Urobili: x   Blood: x / Protein: x / Nitrite: x   Leuk Esterase: x / RBC: x / WBC x   Sq Epi: x / Non Sq Epi: x / Bacteria: x        RADIOLOGY & ADDITIONAL TESTS:  Reviewed .    MEDICATIONS  (STANDING):  metoprolol tartrate 12.5 milliGRAM(s) Oral every 12 hours  multivitamin 1 Tablet(s) Oral every 24 hours  pantoprazole    Tablet 40 milliGRAM(s) Oral every 12 hours  sucralfate suspension 1 Gram(s) Oral every 6 hours    MEDICATIONS  (PRN):     Cardiology Consult    O/N:  Interval History/HPI: Pt seen and examined at bedside. Pt has episodes of tachycardia this morning in the 120's. Has no acute complaints. Denies any chest pain, SOB, dizziness. R  Telemetry:    OBJECTIVE  T(C): 36.8 (08-07-23 @ 10:46), Max: 37.6 (08-07-23 @ 01:00)  HR: 102 (08-07-23 @ 13:00) (78 - 102)  BP: 100/59 (08-07-23 @ 13:00) (96/51 - 113/56)  RR: 15 (08-07-23 @ 13:00) (15 - 18)  SpO2: 100% (08-07-23 @ 13:00) (96% - 100%)    08-06-23 @ 07:01  -  08-07-23 @ 07:00  --------------------------------------------------------  IN: 1050 mL / OUT: 3052 mL / NET: -2002 mL        PHYSICAL EXAM:    Constitutional: resting comfortably in bed; NAD  HEENT: NC/AT, PERRL, EOMI, anicteric sclera, no nasal discharge; uvula midline, no oropharyngeal erythema or exudates; MMM  Neck: supple; no thyromegaly, JVP ? cm H20, JVD +/-  Respiratory: CTA B/L; no W/R/R, no retractions  Cardiac: +S1/S2; RRR; no M/R/G; PMI non-displaced  Gastrointestinal: soft, NT/ND; no rebound or guarding; +BSx4  Extremities: WWP, no clubbing or cyanosis; no peripheral edema  Musculoskeletal: NROM x4; no joint swelling, tenderness or erythema  Vascular: 2+ radial, DP/PT pulses B/L  Dermatologic: skin warm, dry and intact; no rashes, wounds, or scars  Lymphatic: no submandibular or cervical LAD  Neurologic: AAOx3; CNII-XII grossly intact; no focal deficits    LABS:                        9.2    8.96  )-----------( 176      ( 07 Aug 2023 05:30 )             28.7     08-07    140  |  107  |  8   ----------------------------<  94  3.8   |  28  |  0.98    Ca    8.8      07 Aug 2023 05:30  Phos  4.0     08-07  Mg     2.3     08-07    TPro  5.8<L>  /  Alb  3.7  /  TBili  0.3  /  DBili  x   /  AST  16  /  ALT  13  /  AlkPhos  37<L>  08-06      Urinalysis Basic - ( 07 Aug 2023 05:30 )    Color: x / Appearance: x / SG: x / pH: x  Gluc: 94 mg/dL / Ketone: x  / Bili: x / Urobili: x   Blood: x / Protein: x / Nitrite: x   Leuk Esterase: x / RBC: x / WBC x   Sq Epi: x / Non Sq Epi: x / Bacteria: x        RADIOLOGY & ADDITIONAL TESTS:  Reviewed .    MEDICATIONS  (STANDING):  metoprolol tartrate 12.5 milliGRAM(s) Oral every 12 hours  multivitamin 1 Tablet(s) Oral every 24 hours  pantoprazole    Tablet 40 milliGRAM(s) Oral every 12 hours  sucralfate suspension 1 Gram(s) Oral every 6 hours    MEDICATIONS  (PRN):     Cardiology Consult    O/N: Pt seen and examined at bedside. Pt has episodes of tachycardia this morning in the 120's, but overnight remained in the 80s. Has no acute complaints. Denies any chest pain, SOB, dizziness.   Interval History/HPI:   Telemetry:    OBJECTIVE  T(C): 36.8 (08-07-23 @ 10:46), Max: 37.6 (08-07-23 @ 01:00)  HR: 102 (08-07-23 @ 13:00) (78 - 102)  BP: 100/59 (08-07-23 @ 13:00) (96/51 - 113/56)  RR: 15 (08-07-23 @ 13:00) (15 - 18)  SpO2: 100% (08-07-23 @ 13:00) (96% - 100%)    08-06-23 @ 07:01  -  08-07-23 @ 07:00  --------------------------------------------------------  IN: 1050 mL / OUT: 3052 mL / NET: -2002 mL        PHYSICAL EXAM:    Constitutional: resting comfortably in bed; NAD  HEENT: NC/AT, PERRL, EOMI, anicteric sclera, no nasal discharge; uvula midline, no oropharyngeal erythema or exudates; MMM  Neck: supple; no thyromegaly, JVP, no JVD   Respiratory: CTA B/L; no W/R/R, no retractions  Cardiac: +S1/S2; RRR; no M/R/G; PMI non-displaced  Gastrointestinal: soft, NT/ND; no rebound or guarding; +BSx4  Extremities: WWP, no clubbing or cyanosis; no peripheral edema      LABS:                        9.2    8.96  )-----------( 176      ( 07 Aug 2023 05:30 )             28.7     08-07    140  |  107  |  8   ----------------------------<  94  3.8   |  28  |  0.98    Ca    8.8      07 Aug 2023 05:30  Phos  4.0     08-07  Mg     2.3     08-07    TPro  5.8<L>  /  Alb  3.7  /  TBili  0.3  /  DBili  x   /  AST  16  /  ALT  13  /  AlkPhos  37<L>  08-06      Urinalysis Basic - ( 07 Aug 2023 05:30 )    Color: x / Appearance: x / SG: x / pH: x  Gluc: 94 mg/dL / Ketone: x  / Bili: x / Urobili: x   Blood: x / Protein: x / Nitrite: x   Leuk Esterase: x / RBC: x / WBC x   Sq Epi: x / Non Sq Epi: x / Bacteria: x        RADIOLOGY & ADDITIONAL TESTS:  Reviewed .    MEDICATIONS  (STANDING):  metoprolol tartrate 12.5 milliGRAM(s) Oral every 12 hours  multivitamin 1 Tablet(s) Oral every 24 hours  pantoprazole    Tablet 40 milliGRAM(s) Oral every 12 hours  sucralfate suspension 1 Gram(s) Oral every 6 hours    MEDICATIONS  (PRN):

## 2023-08-07 NOTE — DISCHARGE NOTE PROVIDER - NSDCCPCAREPLAN_GEN_ALL_CORE_FT
PRINCIPAL DISCHARGE DIAGNOSIS  Diagnosis: Peptic ulcer disease  Assessment and Plan of Treatment: Peptic ulcers are open sores that develop on the inside lining of your stomach and the upper portion of your small intestine. The most common symptom of a peptic ulcer is stomach pain.  Peptic ulcers include:  Gastric ulcers that occur on the inside of the stomach  Duodenal ulcers that occur on the inside of the upper portion of your small intestine (duodenum)  The most common causes of peptic ulcers are infection with the bacterium Helicobacter pylori (H. pylori) and long-term use of nonsteroidal anti-inflammatory drugs (NSAIDs) such as ibuprofen (Advil, Motrin IB, others) and naproxen sodium (Aleve). Stress and spicy foods do not cause peptic ulcers. However, they can make your symptoms worse.  You had a duodenal ulcer found during endoscopy. It is likely that it contributed to the symptoms you had of a GI bleed. It is important that you take your antacid and follow up with your GI doctor to prevent new bleeding.

## 2023-08-07 NOTE — DISCHARGE NOTE PROVIDER - NSDCMRMEDTOKEN_GEN_ALL_CORE_FT
metoprolol: 25 milligram(s) orally 2 times a day   metoprolol tartrate 25 mg oral tablet: 1 tab(s) orally 2 times a day  pantoprazole 40 mg oral delayed release tablet: 1 tab(s) orally every 12 hours  sucralfate 1 g/10 mL oral suspension: 10 milliliter(s) orally every 6 hours   metoprolol tartrate 25 mg oral tablet: 0.5 tab(s) orally every 12 hours  Protonix 40 mg oral delayed release tablet: 1 tab(s) orally every 12 hours  sucralfate 1 g oral tablet: 1 tab(s) orally once a day

## 2023-08-07 NOTE — DISCHARGE NOTE PROVIDER - CARE PROVIDER_API CALL
Ida Jeffery  Gastroenterology  [address] [place] [address]  Phone: [phone]  Fax: [phone]  Follow Up Time: 1 week

## 2023-08-07 NOTE — PRE-ANESTHESIA EVALUATION ADULT - NSANTHSNORERD_ENT_A_CORE
Major Shift Events:  No acute events this shift. Patient asleep most of the shift. VSS.    Plan: Possible discharge this shift.   For vital signs and complete assessments, please see documentation flowsheets.     Jessy De Jesus RN      No

## 2023-08-09 RX ORDER — OMEPRAZOLE 10 MG/1
1 CAPSULE, DELAYED RELEASE ORAL
Qty: 28 | Refills: 0
Start: 2023-08-09 | End: 2023-08-22

## 2023-08-11 DIAGNOSIS — D62 ACUTE POSTHEMORRHAGIC ANEMIA: ICD-10-CM

## 2023-08-11 DIAGNOSIS — K26.4 CHRONIC OR UNSPECIFIED DUODENAL ULCER WITH HEMORRHAGE: ICD-10-CM

## 2023-08-11 DIAGNOSIS — K29.71 GASTRITIS, UNSPECIFIED, WITH BLEEDING: ICD-10-CM

## 2023-08-11 DIAGNOSIS — R00.0 TACHYCARDIA, UNSPECIFIED: ICD-10-CM

## 2023-08-11 DIAGNOSIS — I95.9 HYPOTENSION, UNSPECIFIED: ICD-10-CM

## 2023-08-11 DIAGNOSIS — D64.9 ANEMIA, UNSPECIFIED: ICD-10-CM

## 2023-08-11 DIAGNOSIS — D69.6 THROMBOCYTOPENIA, UNSPECIFIED: ICD-10-CM

## 2023-08-11 DIAGNOSIS — K29.81 DUODENITIS WITH BLEEDING: ICD-10-CM

## 2023-08-11 PROBLEM — Z00.00 ENCOUNTER FOR PREVENTIVE HEALTH EXAMINATION: Status: ACTIVE | Noted: 2023-08-11

## 2023-08-12 PROBLEM — Z78.9 OTHER SPECIFIED HEALTH STATUS: Chronic | Status: ACTIVE | Noted: 2023-08-04

## 2023-08-14 ENCOUNTER — APPOINTMENT (OUTPATIENT)
Dept: GASTROENTEROLOGY | Facility: CLINIC | Age: 53
End: 2023-08-14

## 2023-08-14 DIAGNOSIS — A04.8 OTHER SPECIFIED BACTERIAL INTESTINAL INFECTIONS: ICD-10-CM

## 2023-08-14 RX ORDER — METRONIDAZOLE 250 MG/1
250 TABLET ORAL
Qty: 56 | Refills: 0 | Status: ACTIVE | COMMUNITY
Start: 2023-08-14 | End: 1900-01-01

## 2023-08-14 RX ORDER — OMEPRAZOLE 20 MG/1
20 TABLET, DELAYED RELEASE ORAL
Qty: 28 | Refills: 0 | Status: ACTIVE | COMMUNITY
Start: 2023-08-14 | End: 1900-01-01

## 2023-08-14 RX ORDER — TETRACYCLINE HYDROCHLORIDE 500 MG/1
500 CAPSULE ORAL
Qty: 56 | Refills: 0 | Status: ACTIVE | COMMUNITY
Start: 2023-08-14 | End: 1900-01-01

## 2023-08-14 RX ORDER — BISMUTH SUBSALICYLATE 262 MG/1
262 TABLET, CHEWABLE ORAL
Qty: 112 | Refills: 0 | Status: ACTIVE | COMMUNITY
Start: 2023-08-14 | End: 1900-01-01

## 2023-10-11 NOTE — CHART NOTE - NSCHARTNOTEFT_GEN_A_CORE
EGD biopsies showing H. pylori. Saw Dr. Jeffery out-patient and was prescribed quadruple therapy for H. pylori.

## 2023-10-11 NOTE — CHART NOTE - NSCHARTNOTESELECT_GEN_ALL_CORE
Patient: Hal Blunt Jr.  : 1951  MRN: [id number]  Age: 71 year old      HPI: Mr. Blunt is seen today for follow-up cardiovascular care for history of PVCs and perhaps a few short episodes of a symptomatic NSVT over the years and an elevated coronary calcium score of about 300 or so. He does not have hypertension or diabetes and never smoked. Lipids have been well controlled on atorvastatin 20 mg daily. He is a retired . He maintains an active lifestyle with regular exercise and enjoys travel as well. He is not experiencing any chest pain dyspnea or palpitations during his exercise. Ventricular ectopy was originally diagnosed in  during a stress test which was performed when he was a . That led to a coronary angiogram at that time which was normal. He subsequently had quite a bit of noninvasive testing over the years which has generally been favorable although he does have a moderately elevated coronary calcium score in the range of 300 or so--study was performed in  and he was at the 70th percentile. In  he had a coronary CT angiogram that had mild calcifications but no obstructive lesions. He has never had a clinical manifestation of ventricular ectopy or any sustained ventricular arrhythmia. There is no personal or family history of sudden cardiac death.   He had a CT scan of abdomen and pelvis in  that did not show any significant vascular abnormalities.  His heart rate has trended down a bit over the last couple of years--his heart rate is rested sometimes as low as 40 but this has not occurred since I stopped his beta-blocker in .  He does have a [employer] device and checks his heart rhythm occasionally--sometimes sinus bradycardia at rest and occasional PVCs.  His Apple Watch indicates sinus bradycardia while sleeping.  His weight is stable over the last year.    Review of Systems   Cardiovascular: Negative for chest pain.   All other systems reviewed and are  EGD Pathology Report Follow-Up negative.      Current Outpatient Medications   Medication Sig Dispense Refill   • atorvastatin (LIPITOR) 20 MG tablet TAKE ONE TABLET BY MOUTH ONE TIME DAILY 90 tablet 0   • Vitamin D, Ergocalciferol, 1.25 mg (50,000 units) capsule Take 1 capsule by mouth every 14 days. 6 capsule 0   • EPINEPHrine (EpiPen 2-Hang) 0.3 MG/0.3ML auto-injector Inject 0.3 mLs into the muscle 1 time as needed for Anaphylaxis. 2 each 0   • Multiple Vitamin (MULTIVITAMIN ADULT PO) Take by mouth daily.     • Ascorbic Acid (VITAMIN C PO) Take by mouth daily.     • fluticasone (FLONASE) 50 MCG/ACT nasal spray Spray 2 sprays in each nostril daily.     • tamsulosin (FLOMAX) 0.4 MG Cap Take 0.4 mg by mouth every evening.       No current facility-administered medications for this visit.       ALLERGIES:  Bee sting, Mefloquine, and Seasonal    Visit Vitals  /79 (BP Location: RUE - Right upper extremity, Patient Position: Sitting)   Pulse 61   Wt 88 kg (194 lb 0.1 oz)   SpO2 98%   BMI 27.84 kg/m²     Physical Exam  Vitals reviewed.   Constitutional:       Appearance: He is well-developed.   HENT:      Head: Normocephalic and atraumatic.   Eyes:      Conjunctiva/sclera: Conjunctivae normal.      Pupils: Pupils are equal, round, and reactive to light.   Neck:      Vascular: No JVD.      Trachea: No tracheal deviation.   Cardiovascular:      Rate and Rhythm: Regular rhythm. Bradycardia present.      Heart sounds: No murmur heard.     No friction rub. No gallop.   Pulmonary:      Effort: Pulmonary effort is normal. No respiratory distress.      Breath sounds: Normal breath sounds. No stridor. No wheezing or rales.   Chest:      Chest wall: No tenderness.   Abdominal:      General: There is no distension.      Palpations: Abdomen is soft.      Tenderness: There is no abdominal tenderness.   Musculoskeletal:         General: Normal range of motion.      Cervical back: Normal range of motion and neck supple.   Skin:     General: Skin is warm and  dry.   Neurological:      Mental Status: He is alert and oriented to person, place, and time.   Psychiatric:         Behavior: Behavior normal.         Thought Content: Thought content normal.         Judgment: Judgment normal.         Data:  EKG January 2021 showed mild sinus bradycardia at 55 bpm but is otherwise within normal limits  EKG January 2022 showed mild sinus bradycardia at 51 bpm with occasional single PVCs.  Tracing is otherwise unremarkable.  Twelve-lead EKG and rhythm strip with his PCP on May 2, 2022 showed mild sinus bradycardia with occasional single PVCs.  EKG today shows mild sinus bradycardia at 57 bpm with a single PVC and is otherwise within normal limits    Exercise stress echocardiogram here in the office May 2022:  1. Stress echo: Left ventricular ejection fraction was normal at rest and     with stress. There is no evidence for stress-induced ischemia.  2. Procedure narrative: Treadmill exercise testing was performed using the     Humberto protocol. The patient exercised for 9 min 30 sec, to protocol     stage 4, to a maximal work rate of 11.1mets. Exercise was terminated     due to achievement of target heart rate. Post-stress images obtained     within 90 seconds of peak stress.  3. Left ventricle: The ejection fraction was measured by visual     estimation. The ejection fraction is 60%.  4. Baseline ECG: Isolated ventricular ectopy. Normal sinus rhythm.  5. Stress: Stress testing did not produce any symptoms. Functional     capacity is above normal (greater than 20%).  6. Stress ECG conclusions: The stress ECG is negative for ischemia. PVCs     were not present at peak exercise.  Impressions:  Normal maximal exercise stress echocardiogram. Isolated PVCs  were noted at rest and in recovery but not at peak exercise.      Lipid panel from September 2019 showed total cholesterol 125, triglycerides 75, HDL 51 and LDL 59.  Comprehensive metabolic panel was within normal limits  Lipid panel from  November 2020 showed total cholesterol 148, triglycerides 97, HDL 55 and LDL 74.  Lipid panel from November 2021 showed total cholesterol 135, triglycerides 92, HDL 51 and LDL 66    CBC was unremarkable other than borderline platelet count of 139--was normal in 2020.  Sodium 143, potassium 4.5 and creatinine 1.09    From November 2022, total cholesterol 152, triglycerides 146, HDL 51, and LDL 72--on atorvastatin 20 mg daily.    Impression/Plan:  Elevated coronary artery calcium score  Continue atorvastatin.  He is doing a good job with maintaining his exercise program.  He had a favorable exercise stress echocardiogram here in the office in May 2022 with above average exercise capacity for age.    Nonsustained paroxysmal ventricular tachycardia (CMS/HCC)  His ventricular ectopy is single PVCs as far as I can determine.  I stopped his beta-blocker at his visit with me in 2020 due to moderate sinus bradycardia.  His current mild sinus bradycardia is partly a reflection of his good level of fitness and perhaps some element of senile sinus node dysfunction.  He had appropriate heart rate response to exercise on his exercise chest echocardiogram May 2022.    Dyslipidemia with elevated low density lipoprotein (LDL) cholesterol and abnormally low high density lipoprotein cholesterol  Well controlled on atorvastatin as reviewed above.      Hal Rosario MD

## (undated) DEVICE — FORCEP RADIAL JAW 4 W NDL 2.2MM 2.8MM 240CM ORANGE DISP